# Patient Record
Sex: MALE | Race: WHITE | NOT HISPANIC OR LATINO | Employment: FULL TIME | URBAN - METROPOLITAN AREA
[De-identification: names, ages, dates, MRNs, and addresses within clinical notes are randomized per-mention and may not be internally consistent; named-entity substitution may affect disease eponyms.]

---

## 2018-01-26 ENCOUNTER — TELEPHONE (OUTPATIENT)
Dept: FAMILY MEDICINE CLINIC | Facility: CLINIC | Age: 58
End: 2018-01-26

## 2018-01-26 DIAGNOSIS — J06.9 ACUTE URI: Primary | ICD-10-CM

## 2018-01-26 RX ORDER — AZITHROMYCIN 250 MG/1
TABLET, FILM COATED ORAL
Qty: 6 TABLET | Refills: 0 | Status: SHIPPED | OUTPATIENT
Start: 2018-01-26 | End: 2018-01-31

## 2018-01-31 DIAGNOSIS — Z00.00 WELL ADULT EXAM: Primary | ICD-10-CM

## 2018-01-31 PROCEDURE — 99499EX: Performed by: FAMILY MEDICINE

## 2018-02-05 ENCOUNTER — TRANSCRIBE ORDERS (OUTPATIENT)
Dept: LAB | Facility: CLINIC | Age: 58
End: 2018-02-05

## 2018-02-05 DIAGNOSIS — Z00.00 WELLNESS EXAMINATION: Primary | ICD-10-CM

## 2018-02-06 ENCOUNTER — HOSPITAL ENCOUNTER (OUTPATIENT)
Dept: ULTRASOUND IMAGING | Facility: HOSPITAL | Age: 58
Discharge: HOME | End: 2018-02-06
Attending: FAMILY MEDICINE

## 2018-02-06 ENCOUNTER — HOSPITAL ENCOUNTER (OUTPATIENT)
Dept: CT IMAGING | Facility: HOSPITAL | Age: 58
Discharge: HOME | End: 2018-02-06
Attending: FAMILY MEDICINE
Payer: COMMERCIAL

## 2018-02-06 ENCOUNTER — OFFICE VISIT (OUTPATIENT)
Dept: LAB | Facility: CLINIC | Age: 58
End: 2018-02-06
Payer: COMMERCIAL

## 2018-02-06 ENCOUNTER — OFFICE VISIT (OUTPATIENT)
Dept: FAMILY MEDICINE CLINIC | Facility: CLINIC | Age: 58
End: 2018-02-06
Payer: COMMERCIAL

## 2018-02-06 ENCOUNTER — HOSPITAL ENCOUNTER (OUTPATIENT)
Dept: NON INVASIVE DIAGNOSTICS | Facility: CLINIC | Age: 58
Discharge: HOME | End: 2018-02-06

## 2018-02-06 ENCOUNTER — HOSPITAL ENCOUNTER (OUTPATIENT)
Dept: ULTRASOUND IMAGING | Facility: HOSPITAL | Age: 58
Discharge: HOME | End: 2018-02-06
Attending: FAMILY MEDICINE
Payer: COMMERCIAL

## 2018-02-06 ENCOUNTER — APPOINTMENT (OUTPATIENT)
Dept: LAB | Facility: CLINIC | Age: 58
End: 2018-02-06
Payer: COMMERCIAL

## 2018-02-06 VITALS
DIASTOLIC BLOOD PRESSURE: 86 MMHG | RESPIRATION RATE: 16 BRPM | HEIGHT: 70 IN | WEIGHT: 177.5 LBS | HEART RATE: 63 BPM | SYSTOLIC BLOOD PRESSURE: 134 MMHG | BODY MASS INDEX: 25.41 KG/M2

## 2018-02-06 DIAGNOSIS — Z00.00 WELL ADULT EXAM: ICD-10-CM

## 2018-02-06 DIAGNOSIS — H61.23 BILATERAL IMPACTED CERUMEN: ICD-10-CM

## 2018-02-06 DIAGNOSIS — Z00.00 WELLNESS EXAMINATION: Primary | ICD-10-CM

## 2018-02-06 DIAGNOSIS — N40.0 BPH WITHOUT OBSTRUCTION/LOWER URINARY TRACT SYMPTOMS: ICD-10-CM

## 2018-02-06 DIAGNOSIS — Z12.11 COLON CANCER SCREENING: Primary | ICD-10-CM

## 2018-02-06 DIAGNOSIS — R03.0 BLOOD PRESSURE ELEVATED WITHOUT HISTORY OF HTN: ICD-10-CM

## 2018-02-06 PROBLEM — E78.01 FAMILIAL HYPERCHOLESTEROLEMIA: Status: ACTIVE | Noted: 2018-02-06

## 2018-02-06 LAB
25(OH)D3 SERPL-MCNC: 34.1 NG/ML (ref 30–100)
ALBUMIN SERPL BCP-MCNC: 4.2 G/DL (ref 3.5–5)
ALP SERPL-CCNC: 74 U/L (ref 46–116)
ALT SERPL W P-5'-P-CCNC: 68 U/L (ref 12–78)
ANION GAP SERPL CALCULATED.3IONS-SCNC: 8 MMOL/L (ref 4–13)
AST SERPL W P-5'-P-CCNC: 24 U/L (ref 5–45)
ATRIAL RATE: 70 BPM
BACTERIA UR QL AUTO: ABNORMAL /HPF
BASOPHILS # BLD AUTO: 0.03 THOUSANDS/ΜL (ref 0–0.1)
BASOPHILS NFR BLD AUTO: 1 % (ref 0–1)
BILIRUB SERPL-MCNC: 1.6 MG/DL (ref 0.2–1)
BILIRUB UR QL STRIP: NEGATIVE
BUN SERPL-MCNC: 17 MG/DL (ref 5–25)
CALCIUM SERPL-MCNC: 9.6 MG/DL (ref 8.3–10.1)
CHLORIDE SERPL-SCNC: 103 MMOL/L (ref 100–108)
CHOLEST SERPL-MCNC: 224 MG/DL (ref 50–200)
CLARITY UR: CLEAR
CO2 SERPL-SCNC: 29 MMOL/L (ref 21–32)
COLOR UR: YELLOW
CREAT SERPL-MCNC: 1.01 MG/DL (ref 0.6–1.3)
CRP SERPL QL: <3 MG/L
EOSINOPHIL # BLD AUTO: 0.09 THOUSAND/ΜL (ref 0–0.61)
EOSINOPHIL NFR BLD AUTO: 2 % (ref 0–6)
ERYTHROCYTE [DISTWIDTH] IN BLOOD BY AUTOMATED COUNT: 12.4 % (ref 11.6–15.1)
EST. AVERAGE GLUCOSE BLD GHB EST-MCNC: 91 MG/DL
GFR SERPL CREATININE-BSD FRML MDRD: 83 ML/MIN/1.73SQ M
GLUCOSE P FAST SERPL-MCNC: 108 MG/DL (ref 65–99)
GLUCOSE UR STRIP-MCNC: NEGATIVE MG/DL
HBA1C MFR BLD: 4.8 % (ref 4.2–6.3)
HCT VFR BLD AUTO: 48.3 % (ref 36.5–49.3)
HDLC SERPL-MCNC: 71 MG/DL (ref 40–60)
HGB BLD-MCNC: 16.9 G/DL (ref 12–17)
HGB UR QL STRIP.AUTO: NEGATIVE
KETONES UR STRIP-MCNC: NEGATIVE MG/DL
LDLC SERPL CALC-MCNC: 124 MG/DL (ref 0–100)
LEUKOCYTE ESTERASE UR QL STRIP: NEGATIVE
LYMPHOCYTES # BLD AUTO: 1.42 THOUSANDS/ΜL (ref 0.6–4.47)
LYMPHOCYTES NFR BLD AUTO: 30 % (ref 14–44)
MCH RBC QN AUTO: 30.5 PG (ref 26.8–34.3)
MCHC RBC AUTO-ENTMCNC: 35 G/DL (ref 31.4–37.4)
MCV RBC AUTO: 87 FL (ref 82–98)
MONOCYTES # BLD AUTO: 0.4 THOUSAND/ΜL (ref 0.17–1.22)
MONOCYTES NFR BLD AUTO: 9 % (ref 4–12)
NEUTROPHILS # BLD AUTO: 2.77 THOUSANDS/ΜL (ref 1.85–7.62)
NEUTS SEG NFR BLD AUTO: 58 % (ref 43–75)
NITRITE UR QL STRIP: NEGATIVE
NON-SQ EPI CELLS URNS QL MICRO: ABNORMAL /HPF
P AXIS: 62 DEGREES
PH UR STRIP.AUTO: 6 [PH] (ref 4.5–8)
PLATELET # BLD AUTO: 225 THOUSANDS/UL (ref 149–390)
PMV BLD AUTO: 10.8 FL (ref 8.9–12.7)
POTASSIUM SERPL-SCNC: 4.4 MMOL/L (ref 3.5–5.3)
PR INTERVAL: 164 MS
PROT SERPL-MCNC: 7.8 G/DL (ref 6.4–8.2)
PROT UR STRIP-MCNC: NEGATIVE MG/DL
PSA SERPL-MCNC: 1 NG/ML (ref 0–4)
QRS AXIS: 67 DEGREES
QRSD INTERVAL: 86 MS
QT INTERVAL: 396 MS
QTC INTERVAL: 427 MS
RBC # BLD AUTO: 5.54 MILLION/UL (ref 3.88–5.62)
RBC #/AREA URNS AUTO: ABNORMAL /HPF
SODIUM SERPL-SCNC: 140 MMOL/L (ref 136–145)
SP GR UR STRIP.AUTO: <=1.005 (ref 1–1.03)
T WAVE AXIS: 54 DEGREES
TRIGL SERPL-MCNC: 144 MG/DL
TSH SERPL DL<=0.05 MIU/L-ACNC: 1.43 UIU/ML (ref 0.36–3.74)
UROBILINOGEN UR QL STRIP.AUTO: 0.2 E.U./DL
VENTRICULAR RATE: 70 BPM
WBC # BLD AUTO: 4.71 THOUSAND/UL (ref 4.31–10.16)
WBC #/AREA URNS AUTO: ABNORMAL /HPF

## 2018-02-06 PROCEDURE — 93306 TTE W/DOPPLER COMPLETE: CPT

## 2018-02-06 PROCEDURE — 75571 CT HRT W/O DYE W/CA TEST: CPT

## 2018-02-06 PROCEDURE — 83036 HEMOGLOBIN GLYCOSYLATED A1C: CPT

## 2018-02-06 PROCEDURE — 93351 STRESS TTE COMPLETE: CPT | Performed by: INTERNAL MEDICINE

## 2018-02-06 PROCEDURE — 93979 VASCULAR STUDY: CPT | Performed by: SURGERY

## 2018-02-06 PROCEDURE — 84153 ASSAY OF PSA TOTAL: CPT

## 2018-02-06 PROCEDURE — 84443 ASSAY THYROID STIM HORMONE: CPT

## 2018-02-06 PROCEDURE — 93005 ELECTROCARDIOGRAM TRACING: CPT

## 2018-02-06 PROCEDURE — 85025 COMPLETE CBC W/AUTO DIFF WBC: CPT

## 2018-02-06 PROCEDURE — 80053 COMPREHEN METABOLIC PANEL: CPT

## 2018-02-06 PROCEDURE — 93350 STRESS TTE ONLY: CPT

## 2018-02-06 PROCEDURE — 93922 UPR/L XTREMITY ART 2 LEVELS: CPT | Performed by: SURGERY

## 2018-02-06 PROCEDURE — 81001 URINALYSIS AUTO W/SCOPE: CPT | Performed by: FAMILY MEDICINE

## 2018-02-06 PROCEDURE — 82306 VITAMIN D 25 HYDROXY: CPT

## 2018-02-06 PROCEDURE — 80061 LIPID PANEL: CPT

## 2018-02-06 PROCEDURE — 99499EX: Performed by: FAMILY MEDICINE

## 2018-02-06 PROCEDURE — 36415 COLL VENOUS BLD VENIPUNCTURE: CPT

## 2018-02-06 PROCEDURE — 76700 US EXAM ABDOM COMPLETE: CPT

## 2018-02-06 PROCEDURE — 93306 TTE W/DOPPLER COMPLETE: CPT | Performed by: INTERNAL MEDICINE

## 2018-02-06 PROCEDURE — 86140 C-REACTIVE PROTEIN: CPT

## 2018-02-06 PROCEDURE — 93880 EXTRACRANIAL BILAT STUDY: CPT | Performed by: SURGERY

## 2018-02-06 PROCEDURE — 3008F BODY MASS INDEX DOCD: CPT | Performed by: FAMILY MEDICINE

## 2018-02-06 RX ORDER — CALCIUM CARBONATE/VITAMIN D3 600 MG-10
1 TABLET ORAL 2 TIMES DAILY
Qty: 60 CAPSULE | Refills: 11 | Status: SHIPPED | COMMUNITY
Start: 2018-02-05 | End: 2022-06-28

## 2018-02-06 RX ORDER — CALCIUM GLUCONATE 45(500) MG
500 TABLET ORAL 2 TIMES DAILY
Qty: 60 TABLET | Refills: 11 | Status: SHIPPED | COMMUNITY
Start: 2018-02-05 | End: 2022-06-28

## 2018-02-06 RX ORDER — CALCIUM CARBONATE 260MG(650)
1 TABLET,CHEWABLE ORAL 2 TIMES DAILY
Qty: 60 TABLET | Refills: 11 | Status: SHIPPED | COMMUNITY
Start: 2018-02-05 | End: 2020-09-17

## 2018-02-06 RX ORDER — MELATONIN
1000 DAILY
Qty: 30 TABLET | Refills: 11 | Status: SHIPPED | COMMUNITY
Start: 2018-02-05 | End: 2022-06-28

## 2018-02-06 NOTE — PROGRESS NOTES
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, P / Human Resources , who presents today to [facility] for a full Wellness / Fitness Exam   He learned about our program through our system website  His PCPs are Khurram Ledesma and Kolby perera in [place], [place]  He reports that his overall health status is "excellent,"  And that it has been "stable" over the last few years  He feels that his overall fitness level is "above average "  He notes that his overall stress level through his career is somewhat high  Review of Systems   Constitutional: Negative for activity change and appetite change  HENT: Negative for hearing loss, rhinorrhea and voice change  Eyes: Negative for visual disturbance  Wears glasses for reading only  Respiratory: Negative for shortness of breath  Cardiovascular: Negative for chest pain, palpitations and leg swelling  Gastrointestinal: Negative for abdominal pain, blood in stool and nausea  Genitourinary: Negative for decreased urine volume, difficulty urinating and frequency  No ED symptoms  One void per night  Musculoskeletal: Negative for arthralgias and neck stiffness  Pt has a chronic right shoulder stiffness  Skin: Negative for rash  Neurological: Negative for syncope and headaches  Hematological: Negative for adenopathy  Psychiatric/Behavioral: Negative for dysphoric mood  The patient is not nervous/anxious  The following portions of the patient's history were reviewed and updated as appropriate: allergies, current medications, past family history, past medical history, past social history and past surgical history       Past Medical History:   Diagnosis Date    Hyperlipidemia      Past Surgical History: Procedure Laterality Date    COLONOSCOPY  2001    COLONOSCOPY  2010    Virtual colonoscopy - clear   KNEE ARTHROSCOPY Left 2006    Meniscus   KNEE ARTHROSCOPY Right 2016    Meniscus   TONSILLECTOMY  1969       Current Outpatient Prescriptions:     calcium gluconate 500 mg tablet, Take 1 tablet (500 mg total) by mouth 2 (two) times a day for 30 days, Disp: 60 tablet, Rfl: 11    cholecalciferol (VITAMIN D3) 1,000 units tablet, Take 1 tablet (1,000 Units total) by mouth daily for 30 days, Disp: 30 tablet, Rfl: 11    Chromium 400 MCG TABS, Take 1 tablet (400 mcg total) by mouth 2 (two) times a day for 30 days, Disp: 60 tablet, Rfl: 11    Omega 3 1200 MG CAPS, Take 1 capsule (1,200 mg total) by mouth 2 (two) times a day for 30 days, Disp: 60 capsule, Rfl: 11    saw palmetto 160 MG capsule, Take 1 capsule (160 mg total) by mouth 2 (two) times a day for 30 days, Disp: 30 capsule, Rfl: 11    Allergies   Allergen Reactions    Penicillins Rash     Family History   Problem Relation Age of Onset    Heart disease Father     Hyperlipidemia Father     Hyperlipidemia Brother      Social History     Social History    Marital status:      Spouse name: N/A    Number of children: 2    Years of education: N/A     Occupational History    SVP, Human Resources       [employer] [address]     Social History Main Topics    Smoking status: Never Smoker    Smokeless tobacco: Never Used    Alcohol use 4 2 - 6 0 oz/week     7 - 10 Standard drinks or equivalent per week    Drug use: No    Sexual activity: Not on file     Other Topics Concern    Not on file     Social History Narrative    Patient exercises 4 - 5 times per week (treadmill, weights, weight machine, calisthenics)  No results found for this or any previous visit  Hearing Screening    Method:  Audiometry    125Hz 250Hz 500Hz 1000Hz 2000Hz 3000Hz 4000Hz 6000Hz 8000Hz   Right ear:  25 25 10 30 15 15 15 30   Left ear:  15 15 15 25 30 35 35 40   Comments: Vene 89 EVALUATION      Name:  Felicia Alas  :  1960  Age:  65 y o  Date of Evaluation: 18     History: [facility] Exam  Reason for visit: Felicia Alas is being seen today at the request of Dr Lillian Jauregui available for an evaluation of hearing  Patient reports that last hearing evaluation (elsewhere approximately 3 years ago) did not reveal  hearing loss in either ear  Hearing aid history:       Right Ear:  none       Left Ear:  none    EVALUATION:    Otoscopic Evaluation:   Right Ear: Moderate cerumen   Left Ear: Moderate cerumen    Tympanometry:   Right: Type A Volume: 1 0  Pressure: -20  Compliance: 1 2    Left: Type A Volume: 1 1  Pressure: 0  Compliance: 1 4     Audiogram Results:    Right Ear: Essentially normal hearing sensitivity except for mild sensorineural hearing loss at 1500 and 2k Hz  Left Ear: Normal hearing sensitivity through 1k Hz sloping to mild high frequency sensorineural hearing loss  Note: 20 dB asymmetry at 4k and 6k Hz        RECOMMENDATIONS:  Return to [facility]  for F/U - asymmetrical high frequency hearing loss  Ear cleaning  Hearing evaluation in 1 year (sooner if deemed medically necessary)  PATIENT EDUCATION:   Discussed results and recommendations with patient  Questions were addressed and the patient was encouraged to contact our department should concerns arise  Mi Perez   Clinical Audiologist]     Visual Acuity Screening    Right eye Left eye Both eyes   Without correction: 20/20 20/20 20/20   With correction:        FITNESS SCREENING:   Carlin Olsen scored at the 50% on his body composition assessment with a bodyfat % of 23%  Carlin Olsen scored in the above average range for flexibility with a sit & reach score of 10 cm  His Muscle Strength/Endurance scores placed his at the 95% (lower body) and 95% (upper body) with a chair stand score of 30 and arm curl score of 37 respectfully   [facility] Score of 51 5 placed him at the 95%  Overall Sarabjit Love would be considered to have an above average fitness level with a 69% score  Continued emphasis on regular exercise and sound nutrition practices will enable Sarabjit Love to maintain his optimal level of fitness      Recent Results (from the past 24 hour(s))   Urinalysis with microscopic    Collection Time: 02/06/18  7:19 AM   Result Value Ref Range    Clarity, UA Clear     Color, UA Yellow     Specific Gravity, UA <=1 005 1 003 - 1 030    pH, UA 6 0 4 5 - 8 0    Glucose, UA Negative Negative mg/dl    Ketones, UA Negative Negative mg/dl    Blood, UA Negative Negative    Protein, UA Negative Negative mg/dl    Nitrite, UA Negative Negative    Bilirubin, UA Negative Negative    Urobilinogen, UA 0 2 0 2, 1 0 E U /dl E U /dl    Leukocytes, UA Negative Negative    WBC, UA 0-1 (A) None Seen, 0-5, 5-55, 5-65 /hpf    RBC, UA None Seen None Seen, 0-5 /hpf    Bacteria, UA None Seen None Seen, Occasional /hpf    Epithelial Cells Occasional None Seen, Occasional /hpf   CBC and differential    Collection Time: 02/06/18  7:19 AM   Result Value Ref Range    WBC 4 71 4 31 - 10 16 Thousand/uL    RBC 5 54 3 88 - 5 62 Million/uL    Hemoglobin 16 9 12 0 - 17 0 g/dL    Hematocrit 48 3 36 5 - 49 3 %    MCV 87 82 - 98 fL    MCH 30 5 26 8 - 34 3 pg    MCHC 35 0 31 4 - 37 4 g/dL    RDW 12 4 11 6 - 15 1 %    MPV 10 8 8 9 - 12 7 fL    Platelets 972 633 - 535 Thousands/uL    Neutrophils Relative 58 43 - 75 %    Lymphocytes Relative 30 14 - 44 %    Monocytes Relative 9 4 - 12 %    Eosinophils Relative 2 0 - 6 %    Basophils Relative 1 0 - 1 %    Neutrophils Absolute 2 77 1 85 - 7 62 Thousands/µL    Lymphocytes Absolute 1 42 0 60 - 4 47 Thousands/µL    Monocytes Absolute 0 40 0 17 - 1 22 Thousand/µL    Eosinophils Absolute 0 09 0 00 - 0 61 Thousand/µL    Basophils Absolute 0 03 0 00 - 0 10 Thousands/µL   Comprehensive metabolic panel    Collection Time: 02/06/18  7:19 AM   Result Value Ref Range Sodium 140 136 - 145 mmol/L    Potassium 4 4 3 5 - 5 3 mmol/L    Chloride 103 100 - 108 mmol/L    CO2 29 21 - 32 mmol/L    Anion Gap 8 4 - 13 mmol/L    BUN 17 5 - 25 mg/dL    Creatinine 1 01 0 60 - 1 30 mg/dL    Glucose, Fasting 108 (H) 65 - 99 mg/dL    Calcium 9 6 8 3 - 10 1 mg/dL    AST 24 5 - 45 U/L    ALT 68 12 - 78 U/L    Alkaline Phosphatase 74 46 - 116 U/L    Total Protein 7 8 6 4 - 8 2 g/dL    Albumin 4 2 3 5 - 5 0 g/dL    Total Bilirubin 1 60 (H) 0 20 - 1 00 mg/dL    eGFR 83 ml/min/1 73sq m   C-reactive protein    Collection Time: 02/06/18  7:19 AM   Result Value Ref Range    CRP <3 0 <3 0 mg/L   Hemoglobin A1c    Collection Time: 02/06/18  7:19 AM   Result Value Ref Range    Hemoglobin A1C 4 8 4 2 - 6 3 %    EAG 91 mg/dl   Lipid Panel with Direct LDL reflex    Collection Time: 02/06/18  7:19 AM   Result Value Ref Range    Cholesterol 224 (H) 50 - 200 mg/dL    Triglycerides 144 <=150 mg/dL    HDL, Direct 71 (H) 40 - 60 mg/dL    LDL Calculated 124 (H) 0 - 100 mg/dL   TSH, 3rd generation with T4 reflex    Collection Time: 02/06/18  7:19 AM   Result Value Ref Range    TSH 3RD GENERATON 1 426 0 358 - 3 740 uIU/mL   Vitamin D 25 hydroxy    Collection Time: 02/06/18  7:19 AM   Result Value Ref Range    Vit D, 25-Hydroxy 34 1 30 0 - 100 0 ng/mL   PSA Total, Diagnostic    Collection Time: 02/06/18  7:19 AM   Result Value Ref Range    PSA 1 0 0 0 - 4 0 ng/mL   ECG 12 lead    Collection Time: 02/06/18 10:35 AM   Result Value Ref Range    Ventricular Rate 70 BPM    Atrial Rate 70 BPM    FL Interval 164 ms    QRSD Interval 86 ms    QT Interval 396 ms    QTC Interval 427 ms    P Axis 62 degrees    QRS Axis 67 degrees    T Wave Axis 54 degrees       Objective:  Vitals:    02/06/18 1124   BP: 134/86   Pulse: 63   Resp: 16        Physical Exam   Constitutional: He is oriented to person, place, and time  He appears well-developed and well-nourished  No distress  HENT:   Head: Normocephalic and atraumatic     Right Ear: Hearing, tympanic membrane, external ear and ear canal normal    Left Ear: Hearing, tympanic membrane, external ear and ear canal normal    Mouth/Throat: Uvula is midline, oropharynx is clear and moist and mucous membranes are normal  Normal dentition  No oropharyngeal exudate or posterior oropharyngeal erythema  Eyes: Conjunctivae and EOM are normal  Pupils are equal, round, and reactive to light  No scleral icterus  Neck: Normal range of motion  Neck supple  No thyromegaly present  Cardiovascular: Normal rate, regular rhythm, normal heart sounds and intact distal pulses  Exam reveals no gallop and no friction rub  No murmur heard  Pulmonary/Chest: Effort normal and breath sounds normal  No respiratory distress  He has no wheezes  He has no rales  Abdominal: Soft  Bowel sounds are normal  He exhibits no distension and no mass  There is no tenderness  There is no rebound and no guarding  Hernia confirmed negative in the right inguinal area and confirmed negative in the left inguinal area  Genitourinary: Testes normal and penis normal  Rectal exam shows external hemorrhoid  Rectal exam shows no mass and anal tone normal  Prostate is enlarged  Prostate is not tender  Right testis shows no mass  Left testis shows no mass  Circumcised  Genitourinary Comments: +1 Sized prostate, but smooth on exam; no nodules  1 Small, non-irritated, non-thrombosed external hemorrhoid  Musculoskeletal: Normal range of motion  He exhibits no edema  Mild TTP in the region of the right subacromial bursa; normal ROM and strength of right shoulder  Lymphadenopathy:     He has no cervical adenopathy  Neurological: He is alert and oriented to person, place, and time  He has normal strength and normal reflexes  No cranial nerve deficit  Gait normal    Skin: Skin is warm  No rash noted  He is not diaphoretic  Psychiatric: He has a normal mood and affect   His behavior is normal  Judgment and thought content normal    Vitals reviewed  Assessment/Plan:    Diagnoses and all orders for this visit:    Wellness examination    Blood pressure elevated without history of HTN    BPH without obstruction/lower urinary tract symptoms    Bilateral impacted cerumen           Executive Physical Summary:  See below  [facility], it was great getting work with you today, and we here at [facility] thank you for your time, and consideration of our program   Recommendations that I have for you today -    1)  Wellness:  Keep up the good work with your regular exercise  Continue annual Influenza Vaccines, and to see your PCPs annually  Do not forget about colon cancer screening with Gastroenterology, and we can help get you information for the group in [facility]  2)  BP Elevated:  Borderline blood pressures today on exam   Continue to work on a healthy, lower salt diet, and regular exercise  I suggest getting a cuff at your pharmacy, and checking intermittently at home  3)  BPH, Without Symptoms:  Prostate is slightly enlarged on exam today Dilan, but with a stable PSA, and lack of symptoms, there is not much to do  You can continue the TXU Suzanna  Watch for any straining to void, slower urine flow, getting up at night to void, etc     4)  Bilateral Cerumen Impaction:  Can try OTC Debrox treatments, and consult with your PCP or ENT for possible irrigation  Again [facility] - it was great getting to see you today  If you need to contact me, don't hesitate to reach out! Be well,    Carmen Amin DO  Office  [phone]  Cell  [phone]  [email or web address]

## 2018-02-06 NOTE — PROGRESS NOTES
Nutritional Summary and Recommendations: Fabby Anglin presents for Nutrition Assessment and Education today  he has met with an RD in the past  He admits that his snacking throughout the day is mostly vending foods and of poor nutrition quality  Discussion focused on balancing meal and snacks times, inclusion of adequate protein consumption,body composition goals and improved serum Vitamin D levels  HT:70",WT: 174 8#, BMI 25 1, Tanita BMR 1671 kcal, Fat Mass 40 2lb,  6lb, Desired Fat Mass 28 0 lb  Meds: reviewed  Labs: Vit D 34 1, Chol 224, Trig 144, HDL 71, , HgbA1C 4 8, Glu 108  Nutrition Diagnoses: Food nutrition knowledge deficit related to sub optimal nutrient balance as evidenced by food recall and patient interview  Goals:  1  Consume 9850-1410 kcal, 90 gram protein daily  2  Food Journal with jamespal   3 Acheive 38 lb fat mass within 3 months and long term goal 27 lb fat mass in 12-18 months  4  Maintain Vit D levels >30 with Vit D prescription as discussed and directed by doctor  5  Include lean proteins at all meals and 1-2 snack daily    Perceived Comprehension: Very Good                          Expected Compliance:Very Good

## 2018-02-06 NOTE — PROGRESS NOTES
Heart and Vascular Summary:     1  Lipids - Total 224, , HDL 71,     2  ECG - Normal ECG    3  Echocardiogram - Normal    4  Stress ECHO - Hypertensive response to stress  No symptoms, Abnormal ECG likely due to hypertensive response  Normal Imaging  No evidence of ischemia  Otherwise normal stress echo  5  Coronary Calcium CT score - 0  Low risk score  6  ASCVD risk score - 5 57%    7  C-reactive protein - <3  (normal)    Impression and Recommendations:    Overall, cardiac risk is low  No indication to treat lipids with statin therapy  Pay close attention to blood pressure  Aim for goal resting blood pressure <130/80  Decrease dietary sodium, less than 2000mg,day and focus on less than 10% sodium per serving  There is no guideline or known beneficial treatment to treating a hypertensive response to stress testing

## 2018-02-07 LAB
CHEST PAIN STATEMENT: NORMAL
MAX DIASTOLIC BP: 70 MMHG
MAX HEART RATE: 153 BPM
MAX PREDICTED HEART RATE: 164 BPM
MAX. SYSTOLIC BP: 218 MMHG
PROTOCOL NAME: NORMAL
REASON FOR TERMINATION: NORMAL
TARGET HR FORMULA: NORMAL
TEST INDICATION: NORMAL
TIME IN EXERCISE PHASE: NORMAL

## 2018-03-12 ENCOUNTER — TELEPHONE (OUTPATIENT)
Dept: GASTROENTEROLOGY | Facility: CLINIC | Age: 58
End: 2018-03-12

## 2018-03-12 PROBLEM — Z12.11 SPECIAL SCREENING FOR MALIGNANT NEOPLASMS, COLON: Status: ACTIVE | Noted: 2018-03-12

## 2018-03-12 NOTE — TELEPHONE ENCOUNTER
03/12/18  Screened by: Erik Thakkar    Referring Provider     Pre- Screening: There is no height or weight on file to calculate BMI  Has patient been referred for a routine screening Colonoscopy? yes  Is the patient over 48years of age? yes    Do you have a family history of colon cancer? yes    Do you have a personal history of colon polyps? yes    Do you have any of the following symptoms?  no    Have you had a coronary or vascular stent within the last year? no    Have you had a heart attack or stroke in the last 6 months? no    Have you had intestinal surgery in the last 3 months? no    Do you have problems with: no    Do you use: no    Have you been hospitalized in the last Month? no    Have you been diagnosed with a bleeding disorder or anemia? no    Have you had chest pain (angina) or breathing problems  (COPD) in the last 3 months? no    Do you have any difficulty walking up a flight of stairs? no    Have you had Kidney failure or insufficiency? no    Have you had heart valve surgery? no    Are you confined to a wheelchair? no    Do you take Other blood thinning medications no    Have you been diagnosed with Diabetes or are you taking any   Diabetic medications? no    : If patient answers NO to medical questions, then schedule procedure  If patient answers YES to medical questions, then schedule office appointment  Previous Colonoscopy yes  Date and Facility of last colonoscopy?      Comments: 3/21/18

## 2018-03-15 ENCOUNTER — TRANSCRIBE ORDERS (OUTPATIENT)
Dept: GASTROENTEROLOGY | Facility: CLINIC | Age: 58
End: 2018-03-15

## 2018-03-27 ENCOUNTER — HOSPITAL ENCOUNTER (OUTPATIENT)
Facility: HOSPITAL | Age: 58
Setting detail: OUTPATIENT SURGERY
Discharge: HOME | End: 2018-03-27
Attending: INTERNAL MEDICINE | Admitting: INTERNAL MEDICINE
Payer: COMMERCIAL

## 2018-03-27 ENCOUNTER — ANESTHESIA (OUTPATIENT)
Dept: PERIOP | Facility: HOSPITAL | Age: 58
End: 2018-03-27
Payer: COMMERCIAL

## 2018-03-27 ENCOUNTER — ANESTHESIA EVENT (OUTPATIENT)
Dept: PERIOP | Facility: HOSPITAL | Age: 58
End: 2018-03-27
Payer: COMMERCIAL

## 2018-03-27 VITALS
TEMPERATURE: 97.4 F | OXYGEN SATURATION: 97 % | BODY MASS INDEX: 25.62 KG/M2 | HEIGHT: 69 IN | DIASTOLIC BLOOD PRESSURE: 82 MMHG | HEART RATE: 61 BPM | RESPIRATION RATE: 14 BRPM | WEIGHT: 173 LBS | SYSTOLIC BLOOD PRESSURE: 121 MMHG

## 2018-03-27 PROCEDURE — G0121 COLON CA SCRN NOT HI RSK IND: HCPCS | Performed by: INTERNAL MEDICINE

## 2018-03-27 RX ORDER — LIDOCAINE HYDROCHLORIDE 10 MG/ML
INJECTION, SOLUTION EPIDURAL; INFILTRATION; INTRACAUDAL; PERINEURAL AS NEEDED
Status: DISCONTINUED | OUTPATIENT
Start: 2018-03-27 | End: 2018-03-27 | Stop reason: SURG

## 2018-03-27 RX ORDER — SODIUM CHLORIDE, SODIUM LACTATE, POTASSIUM CHLORIDE, CALCIUM CHLORIDE 600; 310; 30; 20 MG/100ML; MG/100ML; MG/100ML; MG/100ML
50 INJECTION, SOLUTION INTRAVENOUS CONTINUOUS
Status: DISCONTINUED | OUTPATIENT
Start: 2018-03-27 | End: 2018-03-27 | Stop reason: HOSPADM

## 2018-03-27 RX ORDER — PROPOFOL 10 MG/ML
INJECTION, EMULSION INTRAVENOUS AS NEEDED
Status: DISCONTINUED | OUTPATIENT
Start: 2018-03-27 | End: 2018-03-27 | Stop reason: SURG

## 2018-03-27 RX ADMIN — PROPOFOL 100 MG: 10 INJECTION, EMULSION INTRAVENOUS at 10:21

## 2018-03-27 RX ADMIN — PROPOFOL 40 MG: 10 INJECTION, EMULSION INTRAVENOUS at 10:24

## 2018-03-27 RX ADMIN — SODIUM CHLORIDE, SODIUM LACTATE, POTASSIUM CHLORIDE, AND CALCIUM CHLORIDE 50 ML/HR: .6; .31; .03; .02 INJECTION, SOLUTION INTRAVENOUS at 09:25

## 2018-03-27 RX ADMIN — LIDOCAINE HYDROCHLORIDE 25 MG: 10 INJECTION, SOLUTION EPIDURAL; INFILTRATION; INTRACAUDAL; PERINEURAL at 10:21

## 2018-03-27 RX ADMIN — SODIUM CHLORIDE, SODIUM LACTATE, POTASSIUM CHLORIDE, AND CALCIUM CHLORIDE: .6; .31; .03; .02 INJECTION, SOLUTION INTRAVENOUS at 10:04

## 2018-03-27 NOTE — ANESTHESIA PREPROCEDURE EVALUATION
Review of Systems/Medical History  Patient summary reviewed  Chart reviewed      Cardiovascular  Hyperlipidemia,    Pulmonary       GI/Hepatic            Endo/Other     GYN       Hematology   Musculoskeletal       Neurology   Psychology           Physical Exam    Airway    Mallampati score: II  TM Distance: >3 FB  Neck ROM: full     Dental   No notable dental hx     Cardiovascular  Rhythm: regular, Rate: normal, Cardiovascular exam normal    Pulmonary  Pulmonary exam normal Breath sounds clear to auscultation,     Other Findings        Anesthesia Plan  ASA Score- 2     Anesthesia Type- IV sedation with anesthesia with ASA Monitors  Additional Monitors:   Airway Plan:         Plan Factors-    Induction- intravenous  Postoperative Plan- Plan for postoperative opioid use  Informed Consent- Anesthetic plan and risks discussed with patient  I personally reviewed this patient with the CRNA  Discussed and agreed on the Anesthesia Plan with the CRNA  Arsenio Bradford

## 2018-03-27 NOTE — OP NOTE
Postoperative Note  PATIENT NAME: Warden Shepard  : 1960  MRN: [id number]  [facility] ROOM 01    Surgery Date: 3/27/2018    POST-OP DIAGNOSIS: See the impression below    SEDATION: Monitored anesthesia care, check anesthesia records    PHYSICAL EXAM:  Vitals:    18 1030   BP: 113/70   Pulse: 68   Resp: 16   Temp:    SpO2: 99%     Body mass index is 25 55 kg/m²  General: NAD  Heart: S1 & S2 normal, RRR  Lungs: CTA, No rales or rhonchi  Abdomen: Soft, nontender, nondistended, good bowel sounds    CONSENT:  Informed consent was obtained for the procedure, including sedation after explaining the risks and benefits of the procedure  Risks including but not limited to bleeding, perforation, infection, aspiration were discussed in detail  Also explained about less than 100%$ sensitivity with the exam and other alternatives  DESCRIPTION:   Procedure:  Fiberoptic colonoscopy  Indications:  Routine age-appropriate screening  Last colonoscopy 10 years ago  ASA 2  Premedicated with mac anesthesia    Patient is identified by me  He is examined prior to the procedure found to be in stable condition  He is attached to the cardiac monitor and pulse oximeter and placed in the left lateral position  After adequate intravenous sedation the Olympus video colonoscope was inserted and passed easily to the terminal ileum  The ileocecal valve and appendiceal orifice are identified  The scope was then slowly withdrawn with excellent circumferential visualization of the mucosa  The preparation is excellent  The terminal ileum up to 10 cm in the entire and the colonic mucosa out completely unremarkable with no inflammatory neoplastic vascular lesions noted  Retroversion is normal  Incidental note is made of a small thrombosed external hemorrhoid which is not bleeding  The patient tolerated the procedure very well and was stable throughout      My impression:  Normal colonoscopy, normal terminal ileoscopy RECOMMENDATIONS:  Follow-up colonoscopy in 10 years  Yearly  FIT test with primary care physician  Every 3 year Cologuard test with primary care physician    COMPLICATIONS:  None; patient tolerated the procedure well  DISPOSITION: PACU  CONDITION: Stable    Benjamin Hawthorne MD  3/27/2018,10:31 AM        Portions of the record may have been created with voice recognition software   Occasional wrong word or "sound a like" substitutions may have occurred due to the inherent limitations of voice recognition software   Read the chart carefully and recognize, using context, where substitutions have occurred

## 2018-03-27 NOTE — DISCHARGE INSTRUCTIONS
Colonoscopy   AMBULATORY CARE:   What you need to know about a colonoscopy:  A colonoscopy is a procedure to examine the inside of your colon (intestine) with a scope  A scope is a flexible tube with a small light and camera on the end  Polyps or tissue growths may be removed during your colonoscopy  What you need to do the week before your colonoscopy: You will need to stop taking medicines that contain aspirin or iron for 7 days before your colonoscopy  If you take anticoagulants, such as warfarin, ask when you should stop taking it  Make plans for someone to drive you home after your procedure  How to prepare for your colonoscopy: Your healthcare provider will have you prepare your bowels before your procedure  Your bowels will need to be empty before your procedure to allow him to clearly see your colon  You will need to do the following the day before your procedure:  · Have only clear liquids  for the entire day before your colonoscopy  Clear liquid diet includes clear fruit juices and broths, clear flavored gelatin, and hard candy  It also includes coffee, tea, carbonated beverages, and clear sports drinks  · Follow your bowel prep as directed  There are many different preparations that can be given before a colonoscopy  Some are given over 2 hours and others over 6 hours  Some are given earlier in the afternoon the day before the colonoscopy  Others are given the day before and then the morning of the colonoscopy  With any bowel prep, stay close to the bathroom  This liquid will cause your bowels to move frequently  · An enema  may be needed  Your healthcare provider may tell you to use an enema to help clean out your bowels  · Do not eat or drink anything after midnight  This will help prevent problems that can happen if you vomit while under anesthesia  What will happen during your colonoscopy:   · You will be given medicine to help you relax   You will lie on your left side and raise one or both knees toward your chest  Your healthcare provider will examine your anus and use a finger to check your rectum  You may need another enema if your bowel is not empty  The scope will be lubricated and gently placed into your anus  It will then be passed through your rectum and into your colon  Water or air will be put into your colon to help clean or expand it  This is done so your healthcare provider can see your colon clearly  · Tissue samples may be taken from the walls of your bowel and sent to a lab for tests  If you have a polyp, your healthcare provider will pass a wire loop through the scope and use it to hold the polyp  The polyp is then burned or cut off the wall of your colon  Removed polyps are sent to a lab for tests  Pictures of your colon may be taken during the procedure  The scope will be removed when the procedure is done  What will happen after your colonoscopy:   · Rest after your procedure  You may feel bloated, have some gas and abdominal discomfort  You may need to lie on your right side with a heating pad on your abdomen  You may need to take short walks to help move the gas out  Eat small meals, if you feel bloated  Do not drive or make important decisions until the day after your procedure  · You may have polyps removed  Do not take aspirin or go on long car trips for 7 days after your procedure  Ask your healthcare provider about any other limits after your procedure  Risks of a colonoscopy: You may have pain or bleeding after the scope or polyps are removed  You may also have a slow heartbeat, decreased blood pressure, or increased sweating  Your colon may tear due to the increased pressure from the scope and other instruments  This may cause bowel contents to leak out of your colon and into your abdomen  If this happens, you will need to stay in the hospital and have surgery on your colon     Seek care immediately if:   · You have a large amount of bright red blood in your bowel movements  · Your abdomen is hard and firm and you have severe pain  · You have sudden trouble breathing  Contact your healthcare provider if:   · You develop a rash or hives  · You have a fever within 24 hours of your procedure  · You have not had a bowel movement for 3 days after your procedure  · You have questions or concerns about your condition or care  Activity:   · Do not lift, strain, or run  for 3 days after your procedure  · Rest after your procedure  You have been given medicine to relax you  Do not  drive or make important decisions until the day after your procedure  Return to your normal activity as directed  · Relieve gas and discomfort from bloating  by lying on your right side with a heating pad on your abdomen  You may need to take short walks to help the gas move out  Eat small meals until bloating is relieved  If you had polyps removed: For 7 days after your procedure:  · Do not  take aspirin  · Do not  go on long car rides  Help prevent constipation:   · Eat a variety of healthy foods  Healthy foods include fruit, vegetables, whole-grain breads, low-fat dairy products, beans, lean meat, and fish  Ask if you need to be on a special diet  Your healthcare provider may recommend that you eat high-fiber foods such as cooked beans  Fiber helps you have regular bowel movements  · Drink liquids as directed  Adults should drink between 9 and 13 eight-ounce cups of liquid every day  Ask what amount is best for you  For most people, good liquids to drink are water, juice, and milk  · Exercise as directed  Talk to your healthcare provider about the best exercise plan for you  Exercise can help prevent constipation, decrease your blood pressure and improve your health  Follow up with your healthcare provider as directed:  Write down your questions so you remember to ask them during your visits     © 2017 [employer] Information is for End User's use only and may not be sold, redistributed or otherwise used for commercial purposes  All illustrations and images included in CareNotes® are the copyrighted property of A D [employer] M [employer]  or Basil Suazo  The above information is an  only  It is not intended as medical advice for individual conditions or treatments  Talk to your doctor, nurse or pharmacist before following any medical regimen to see if it is safe and effective for you

## 2018-03-27 NOTE — ANESTHESIA POSTPROCEDURE EVALUATION
Post-Op Assessment Note      CV Status:  Stable    Mental Status:  Awake    Hydration Status:  Stable    PONV Controlled:  None    Airway Patency:  Patent    Post Op Vitals Reviewed: Yes          Staff: Anesthesiologist, CRNA           /70 (03/27/18 1030)    Temp      Pulse 68 (03/27/18 1030)   Resp 16 (03/27/18 1030)    SpO2 99 % (03/27/18 1030)

## 2019-04-11 DIAGNOSIS — Z00.00 WELLNESS EXAMINATION: Primary | ICD-10-CM

## 2019-04-11 PROCEDURE — 99499EX: Performed by: FAMILY MEDICINE

## 2019-04-16 ENCOUNTER — HOSPITAL ENCOUNTER (OUTPATIENT)
Dept: NON INVASIVE DIAGNOSTICS | Facility: CLINIC | Age: 59
Discharge: HOME | End: 2019-04-16

## 2019-04-16 ENCOUNTER — OFFICE VISIT (OUTPATIENT)
Dept: FAMILY MEDICINE CLINIC | Facility: CLINIC | Age: 59
End: 2019-04-16

## 2019-04-16 ENCOUNTER — APPOINTMENT (OUTPATIENT)
Dept: LAB | Facility: CLINIC | Age: 59
End: 2019-04-16

## 2019-04-16 VITALS
SYSTOLIC BLOOD PRESSURE: 142 MMHG | WEIGHT: 169 LBS | DIASTOLIC BLOOD PRESSURE: 80 MMHG | BODY MASS INDEX: 25.03 KG/M2 | HEIGHT: 69 IN | RESPIRATION RATE: 16 BRPM

## 2019-04-16 DIAGNOSIS — Z00.00 WELLNESS EXAMINATION: ICD-10-CM

## 2019-04-16 DIAGNOSIS — N40.0 BPH WITHOUT OBSTRUCTION/LOWER URINARY TRACT SYMPTOMS: ICD-10-CM

## 2019-04-16 DIAGNOSIS — Z00.00 WELLNESS EXAMINATION: Primary | ICD-10-CM

## 2019-04-16 DIAGNOSIS — H61.23 CERUMEN DEBRIS ON TYMPANIC MEMBRANE OF BOTH EARS: ICD-10-CM

## 2019-04-16 DIAGNOSIS — R97.20 ELEVATED PSA MEASUREMENT: ICD-10-CM

## 2019-04-16 DIAGNOSIS — R03.0 BLOOD PRESSURE ELEVATED WITHOUT HISTORY OF HTN: ICD-10-CM

## 2019-04-16 LAB
25(OH)D3 SERPL-MCNC: 39.6 NG/ML (ref 30–100)
ALBUMIN SERPL BCP-MCNC: 4 G/DL (ref 3.5–5)
ALP SERPL-CCNC: 67 U/L (ref 46–116)
ALT SERPL W P-5'-P-CCNC: 43 U/L (ref 12–78)
ANION GAP SERPL CALCULATED.3IONS-SCNC: 6 MMOL/L (ref 4–13)
AST SERPL W P-5'-P-CCNC: 21 U/L (ref 5–45)
ATRIAL RATE: 65 BPM
BACTERIA UR QL AUTO: NORMAL /HPF
BASOPHILS # BLD AUTO: 0.04 THOUSANDS/ΜL (ref 0–0.1)
BASOPHILS NFR BLD AUTO: 1 % (ref 0–1)
BILIRUB SERPL-MCNC: 1.8 MG/DL (ref 0.2–1)
BILIRUB UR QL STRIP: NEGATIVE
BUN SERPL-MCNC: 13 MG/DL (ref 5–25)
CALCIUM SERPL-MCNC: 9.6 MG/DL (ref 8.3–10.1)
CHLORIDE SERPL-SCNC: 101 MMOL/L (ref 100–108)
CHOLEST SERPL-MCNC: 176 MG/DL (ref 50–200)
CLARITY UR: CLEAR
CO2 SERPL-SCNC: 29 MMOL/L (ref 21–32)
COLOR UR: YELLOW
CREAT SERPL-MCNC: 0.92 MG/DL (ref 0.6–1.3)
CRP SERPL HS-MCNC: <0.9 MG/L
EOSINOPHIL # BLD AUTO: 0.06 THOUSAND/ΜL (ref 0–0.61)
EOSINOPHIL NFR BLD AUTO: 1 % (ref 0–6)
ERYTHROCYTE [DISTWIDTH] IN BLOOD BY AUTOMATED COUNT: 11.9 % (ref 11.6–15.1)
EST. AVERAGE GLUCOSE BLD GHB EST-MCNC: 111 MG/DL
GFR SERPL CREATININE-BSD FRML MDRD: 92 ML/MIN/1.73SQ M
GLUCOSE P FAST SERPL-MCNC: 104 MG/DL (ref 65–99)
GLUCOSE UR STRIP-MCNC: NEGATIVE MG/DL
HBA1C MFR BLD: 5.5 % (ref 4.2–6.3)
HCT VFR BLD AUTO: 49 % (ref 36.5–49.3)
HCV AB SER QL: NORMAL
HDLC SERPL-MCNC: 53 MG/DL (ref 40–60)
HGB BLD-MCNC: 17.1 G/DL (ref 12–17)
HGB UR QL STRIP.AUTO: NEGATIVE
IMM GRANULOCYTES # BLD AUTO: 0.01 THOUSAND/UL (ref 0–0.2)
IMM GRANULOCYTES NFR BLD AUTO: 0 % (ref 0–2)
KETONES UR STRIP-MCNC: NEGATIVE MG/DL
LDLC SERPL CALC-MCNC: 104 MG/DL (ref 0–100)
LEUKOCYTE ESTERASE UR QL STRIP: NEGATIVE
LYMPHOCYTES # BLD AUTO: 1.16 THOUSANDS/ΜL (ref 0.6–4.47)
LYMPHOCYTES NFR BLD AUTO: 25 % (ref 14–44)
MCH RBC QN AUTO: 31.5 PG (ref 26.8–34.3)
MCHC RBC AUTO-ENTMCNC: 34.9 G/DL (ref 31.4–37.4)
MCV RBC AUTO: 90 FL (ref 82–98)
MONOCYTES # BLD AUTO: 0.52 THOUSAND/ΜL (ref 0.17–1.22)
MONOCYTES NFR BLD AUTO: 11 % (ref 4–12)
NEUTROPHILS # BLD AUTO: 2.79 THOUSANDS/ΜL (ref 1.85–7.62)
NEUTS SEG NFR BLD AUTO: 62 % (ref 43–75)
NITRITE UR QL STRIP: NEGATIVE
NON-SQ EPI CELLS URNS QL MICRO: NORMAL /HPF
NRBC BLD AUTO-RTO: 0 /100 WBCS
P AXIS: 60 DEGREES
PH UR STRIP.AUTO: 7 [PH]
PLATELET # BLD AUTO: 198 THOUSANDS/UL (ref 149–390)
PMV BLD AUTO: 10.4 FL (ref 8.9–12.7)
POTASSIUM SERPL-SCNC: 4.2 MMOL/L (ref 3.5–5.3)
PR INTERVAL: 152 MS
PROT SERPL-MCNC: 7.5 G/DL (ref 6.4–8.2)
PROT UR STRIP-MCNC: NEGATIVE MG/DL
PSA SERPL-MCNC: 3.9 NG/ML (ref 0–4)
QRS AXIS: 67 DEGREES
QRSD INTERVAL: 88 MS
QT INTERVAL: 404 MS
QTC INTERVAL: 420 MS
RBC # BLD AUTO: 5.42 MILLION/UL (ref 3.88–5.62)
RBC #/AREA URNS AUTO: NORMAL /HPF
SODIUM SERPL-SCNC: 136 MMOL/L (ref 136–145)
SP GR UR STRIP.AUTO: <=1.005 (ref 1–1.03)
T WAVE AXIS: 47 DEGREES
TRIGL SERPL-MCNC: 96 MG/DL
TSH SERPL DL<=0.05 MIU/L-ACNC: 1.49 UIU/ML (ref 0.36–3.74)
UROBILINOGEN UR QL STRIP.AUTO: 0.2 E.U./DL
VENTRICULAR RATE: 65 BPM
WBC # BLD AUTO: 4.58 THOUSAND/UL (ref 4.31–10.16)
WBC #/AREA URNS AUTO: NORMAL /HPF

## 2019-04-16 PROCEDURE — 86141 C-REACTIVE PROTEIN HS: CPT

## 2019-04-16 PROCEDURE — 85025 COMPLETE CBC W/AUTO DIFF WBC: CPT

## 2019-04-16 PROCEDURE — 84443 ASSAY THYROID STIM HORMONE: CPT

## 2019-04-16 PROCEDURE — 93005 ELECTROCARDIOGRAM TRACING: CPT

## 2019-04-16 PROCEDURE — 84153 ASSAY OF PSA TOTAL: CPT

## 2019-04-16 PROCEDURE — 80053 COMPREHEN METABOLIC PANEL: CPT

## 2019-04-16 PROCEDURE — 86803 HEPATITIS C AB TEST: CPT

## 2019-04-16 PROCEDURE — 93306 TTE W/DOPPLER COMPLETE: CPT

## 2019-04-16 PROCEDURE — 82306 VITAMIN D 25 HYDROXY: CPT

## 2019-04-16 PROCEDURE — 99499EX: Performed by: FAMILY MEDICINE

## 2019-04-16 PROCEDURE — 81001 URINALYSIS AUTO W/SCOPE: CPT

## 2019-04-16 PROCEDURE — 80061 LIPID PANEL: CPT

## 2019-04-16 PROCEDURE — 93350 STRESS TTE ONLY: CPT

## 2019-04-16 PROCEDURE — 83036 HEMOGLOBIN GLYCOSYLATED A1C: CPT

## 2019-04-16 PROCEDURE — 36415 COLL VENOUS BLD VENIPUNCTURE: CPT

## 2019-04-16 RX ORDER — LISINOPRIL 10 MG/1
10 TABLET ORAL DAILY
Qty: 30 TABLET | Refills: 5 | Status: SHIPPED | OUTPATIENT
Start: 2019-04-16 | End: 2019-11-13 | Stop reason: SDUPTHER

## 2019-04-19 LAB
CHEST PAIN STATEMENT: NORMAL
MAX DIASTOLIC BP: 80 MMHG
MAX HEART RATE: 162 BPM
MAX PREDICTED HEART RATE: 163 BPM
MAX. SYSTOLIC BP: 212 MMHG
PROTOCOL NAME: NORMAL
REASON FOR TERMINATION: NORMAL
TARGET HR FORMULA: NORMAL
TEST INDICATION: NORMAL
TIME IN EXERCISE PHASE: NORMAL

## 2019-06-07 ENCOUNTER — OFFICE VISIT (OUTPATIENT)
Dept: FAMILY MEDICINE CLINIC | Facility: CLINIC | Age: 59
End: 2019-06-07
Payer: COMMERCIAL

## 2019-06-07 VITALS
BODY MASS INDEX: 25.77 KG/M2 | OXYGEN SATURATION: 98 % | TEMPERATURE: 97.5 F | HEIGHT: 69 IN | HEART RATE: 76 BPM | WEIGHT: 174 LBS | DIASTOLIC BLOOD PRESSURE: 84 MMHG | SYSTOLIC BLOOD PRESSURE: 124 MMHG

## 2019-06-07 DIAGNOSIS — I10 ESSENTIAL HYPERTENSION: Primary | ICD-10-CM

## 2019-06-07 DIAGNOSIS — R97.20 ELEVATED PSA MEASUREMENT: ICD-10-CM

## 2019-06-07 DIAGNOSIS — E66.3 OVERWEIGHT (BMI 25.0-29.9): ICD-10-CM

## 2019-06-07 PROCEDURE — 3008F BODY MASS INDEX DOCD: CPT | Performed by: FAMILY MEDICINE

## 2019-06-07 PROCEDURE — 1036F TOBACCO NON-USER: CPT | Performed by: FAMILY MEDICINE

## 2019-06-07 PROCEDURE — 3079F DIAST BP 80-89 MM HG: CPT | Performed by: FAMILY MEDICINE

## 2019-06-07 PROCEDURE — 99213 OFFICE O/P EST LOW 20 MIN: CPT | Performed by: FAMILY MEDICINE

## 2019-06-07 PROCEDURE — 3074F SYST BP LT 130 MM HG: CPT | Performed by: FAMILY MEDICINE

## 2019-07-31 ENCOUNTER — OFFICE VISIT (OUTPATIENT)
Dept: DERMATOLOGY | Facility: CLINIC | Age: 59
End: 2019-07-31
Payer: COMMERCIAL

## 2019-07-31 DIAGNOSIS — Z13.89 SCREENING FOR SKIN CONDITION: ICD-10-CM

## 2019-07-31 DIAGNOSIS — D22.9 NEVUS: Primary | ICD-10-CM

## 2019-07-31 DIAGNOSIS — Z85.820 HISTORY OF MELANOMA: ICD-10-CM

## 2019-07-31 PROCEDURE — 99203 OFFICE O/P NEW LOW 30 MIN: CPT | Performed by: DERMATOLOGY

## 2019-07-31 NOTE — PATIENT INSTRUCTIONS
Nevi reviewed the concept of ABCDE and ugly duckling nothing markedly atypical patient reassured  History of the melanoma probably Level 4    0 4 mm melanoma situ versus severely atypical nevus continue to monitor    Patient should be seen yearly for checkup  Screening for Dermatologic Disorders: Nothing else of concern noted on complete exam follow up in 1 year

## 2019-07-31 NOTE — PROGRESS NOTES
[facility] DERMATOLOGY  [address] [place] [id number]  [phone]  [phone]     MRN: [id number] : 1960  Encounter: [id number]  Patient Information: Javier Acevedo  Chief complaint:  Skin check    History of present illness:  73-year-old male presents for concerns regarding moles patient needs recently had a mole removed by Dr Sarai Gonsalez which was read out as a severely atypical dysplastic nevus borderline or are evolving melanoma  Biopsy showed a depth of 0 4 mm possibly  However even the staining for cells was not real clear cut confirming the diagnosis of melanoma versus nevus  Suggestion of FISH staining was also suggested  Dr Sarai Gonsalez did perform a wider excision at this point not or other remnants was noted  Past Medical History:   Diagnosis Date    Hyperlipidemia      Past Surgical History:   Procedure Laterality Date    COLONOSCOPY      COLONOSCOPY      Virtual colonoscopy - clear   KNEE ARTHROSCOPY Left 2006    Meniscus   KNEE ARTHROSCOPY Right 2016    Meniscus   [place] COLONOSCOPY FLX DX W/COLLJ SPEC WHEN PFRMD N/A 3/27/2018    Procedure: COLONOSCOPY;  Surgeon: Janet Montes MD;  Location: [facility]; Service: Gastroenterology    TONSILLECTOMY  1969     Social History   Social History     Substance and Sexual Activity   Alcohol Use Yes    Alcohol/week: 7 0 - 10 0 standard drinks    Types: 7 - 10 Standard drinks or equivalent per week     Social History     Substance and Sexual Activity   Drug Use No     Social History     Tobacco Use   Smoking Status Never Smoker   Smokeless Tobacco Never Used     Family History   Problem Relation Age of Onset    Heart disease Father     Hyperlipidemia Father     Hyperlipidemia Brother      Meds/Allergies   Allergies   Allergen Reactions    Penicillins Rash       Meds:  Prior to Admission medications    Medication Sig Start Date End Date Taking?  Authorizing Provider   calcium gluconate 500 mg tablet Take 1 tablet (500 mg total) by mouth 2 (two) times a day for 30 days 2/5/18 7/31/19 Yes Pro Navarro DO   cholecalciferol (VITAMIN D3) 1,000 units tablet Take 1 tablet (1,000 Units total) by mouth daily for 30 days 2/5/18 7/31/19 Yes Pro Navarro DO   Chromium 400 MCG TABS Take 1 tablet (400 mcg total) by mouth 2 (two) times a day for 30 days 2/5/18 7/31/19 Yes Pro Navarro DO   lisinopril (ZESTRIL) 10 mg tablet Take 1 tablet (10 mg total) by mouth daily 4/16/19  Yes Pro Navarro DO   Omega 3 1200 MG CAPS Take 1 capsule (1,200 mg total) by mouth 2 (two) times a day for 30 days 2/5/18 7/31/19 Yes Pro Navarro DO       Subjective:     Review of Systems:    General: negative for - chills, fatigue, fever,  weight gain or weight loss  Psychological: negative for - anxiety, behavioral disorder, concentration difficulties, decreased libido, depression, irritability, memory difficulties, mood swings, sleep disturbances or suicidal ideation  ENT: negative for - hearing difficulties , nasal congestion, nasal discharge, oral lesions, sinus pain, sneezing, sore throat  Allergy and Immunology: negative for - hives, insect bite sensitivity,  Hematological and Lymphatic: negative for - bleeding problems, blood clots,bruising, swollen lymph nodes  Endocrine: negative for - hair pattern changes, hot flashes, malaise/lethargy, mood swings, palpitations, polydipsia/polyuria, skin changes, temperature intolerance or unexpected weight change  Respiratory: negative for - cough, hemoptysis, orthopnea, shortness of breath, or wheezing  Cardiovascular: negative for - chest pain, dyspnea on exertion, edema,  Gastrointestinal: negative for - abdominal pain, nausea/vomiting  Genito-Urinary: negative for - dysuria, incontinence, irregular/heavy menses or urinary frequency/urgency  Musculoskeletal: negative for - gait disturbance, joint pain, joint stiffness, joint swelling, muscle pain, muscular weakness  Dermatological:  As in HPI  Neurological: negative for confusion, dizziness, headaches, impaired coordination/balance, memory loss, numbness/tingling, seizures, speech problems, tremors or weakness       Objective: There were no vitals taken for this visit  Physical Exam:    General Appearance:    Alert, cooperative, no distress   Head:    Normocephalic, without obvious abnormality, atraumatic           Skin:   A full skin exam was performed including scalp, head scalp, eyes, ears, nose, lips, neck, chest, axilla, abdomen, back, buttocks, bilateral upper extremities, bilateral lower extremities, hands, feet, fingers, toes, fingernails, and toenails normal pigmented lesions all regular shape and color only little larger 1 that is noted at this point is 1 on the left posterior thigh that that measures about 6 mm size nothing else remarkable noted on exam       Assessment:       1  Nevus     2  Screening for skin condition     3  History of melanoma           Plan:   Nevi reviewed the concept of ABCDE and ugly duckling nothing markedly atypical patient reassured  History of the melanoma probably Level 4    0 4 mm melanoma situ versus severely atypical nevus continue to monitor  Patient should be seen yearly for checkup  Screening for Dermatologic Disorders: Nothing else of concern noted on complete exam follow up in 1 year       Evelia Sumner MD  7/31/2019,1:12 PM    Portions of the record may have been created with voice recognition software   Occasional wrong word or "sound a like" substitutions may have occurred due to the inherent limitations of voice recognition software   Read the chart carefully and recognize, using context, where substitutions have occurred

## 2019-11-13 DIAGNOSIS — R03.0 BLOOD PRESSURE ELEVATED WITHOUT HISTORY OF HTN: ICD-10-CM

## 2019-11-13 RX ORDER — LISINOPRIL 10 MG/1
TABLET ORAL
Qty: 30 TABLET | Refills: 5 | Status: SHIPPED | OUTPATIENT
Start: 2019-11-13 | End: 2020-03-14

## 2020-03-14 DIAGNOSIS — R03.0 BLOOD PRESSURE ELEVATED WITHOUT HISTORY OF HTN: ICD-10-CM

## 2020-03-14 RX ORDER — LISINOPRIL 10 MG/1
TABLET ORAL
Qty: 90 TABLET | Refills: 1 | Status: SHIPPED | OUTPATIENT
Start: 2020-03-14 | End: 2020-09-14

## 2020-08-05 ENCOUNTER — OFFICE VISIT (OUTPATIENT)
Dept: DERMATOLOGY | Facility: CLINIC | Age: 60
End: 2020-08-05
Payer: COMMERCIAL

## 2020-08-05 VITALS — TEMPERATURE: 97.2 F

## 2020-08-05 DIAGNOSIS — Z85.820 HISTORY OF MELANOMA: ICD-10-CM

## 2020-08-05 DIAGNOSIS — Z13.89 SCREENING FOR SKIN CONDITION: ICD-10-CM

## 2020-08-05 DIAGNOSIS — D22.9 NEVUS: Primary | ICD-10-CM

## 2020-08-05 PROCEDURE — 3074F SYST BP LT 130 MM HG: CPT | Performed by: DERMATOLOGY

## 2020-08-05 PROCEDURE — 3079F DIAST BP 80-89 MM HG: CPT | Performed by: DERMATOLOGY

## 2020-08-05 PROCEDURE — 1036F TOBACCO NON-USER: CPT | Performed by: DERMATOLOGY

## 2020-08-05 PROCEDURE — 99213 OFFICE O/P EST LOW 20 MIN: CPT | Performed by: DERMATOLOGY

## 2020-08-05 NOTE — PATIENT INSTRUCTIONS
Nevi reviewed the concept of ABCDE and ugly duckling nothing markedly atypical patient reassured  History of the melanoma probably Level 4    0 4 mm melanoma situ versus severely atypical nevus continue to monitor    Patient should be seen yearly for checkup  Screening for Dermatologic Disorders: Nothing else of concern noted on complete exam follow up in 6 months

## 2020-08-05 NOTE — PROGRESS NOTES
[address]  [facility] [address]  [place] [address]  [phone]  [phone]     MRN: [id number] : 1960  Encounter: [id number]  Patient Information: Niya Richey  Chief complaint:  Yearly check up    History of present illness:  23-year-old male with previous history of atypical nevus question early melanoma on the left knee will removed previously by Dr Hailee Lyles  Presents for overall checkup concerned regarding several moles and overall concerns regarding skin cancer  Past Medical History:   Diagnosis Date    Hyperlipidemia      Past Surgical History:   Procedure Laterality Date    COLONOSCOPY      COLONOSCOPY      Virtual colonoscopy - clear   KNEE ARTHROSCOPY Left     Meniscus   KNEE ARTHROSCOPY Right 2016    Meniscus   [place] COLONOSCOPY FLX DX W/COLLJ SPEC WHEN PFRMD N/A 3/27/2018    Procedure: COLONOSCOPY;  Surgeon: Bowen Morrell MD;  Location: [facility]; Service: Gastroenterology    TONSILLECTOMY       Social History   Social History     Substance and Sexual Activity   Alcohol Use Yes    Alcohol/week: 7 0 - 10 0 standard drinks    Types: 7 - 10 Standard drinks or equivalent per week     Social History     Substance and Sexual Activity   Drug Use No     Social History     Tobacco Use   Smoking Status Never Smoker   Smokeless Tobacco Never Used     Family History   Problem Relation Age of Onset    Heart disease Father     Hyperlipidemia Father     Hyperlipidemia Brother      Meds/Allergies   Allergies   Allergen Reactions    Penicillins Rash       Meds:  Prior to Admission medications    Medication Sig Start Date End Date Taking?  Authorizing Provider   calcium gluconate 500 mg tablet Take 1 tablet (500 mg total) by mouth 2 (two) times a day for 30 days 18 Yes Pro Navarro DO   cholecalciferol (VITAMIN D3) 1,000 units tablet Take 1 tablet (1,000 Units total) by mouth daily for 30 days 18 8/5/20 Yes Pro Navarro DO   lisinopril (ZESTRIL) 10 mg tablet TAKE 1 TABLET BY MOUTH EVERY DAY 3/14/20  Yes Pro Navarro DO   Omega 3 1200 MG CAPS Take 1 capsule (1,200 mg total) by mouth 2 (two) times a day for 30 days 2/5/18 8/5/20 Yes Pro Navarro DO   Chromium 400 MCG TABS Take 1 tablet (400 mcg total) by mouth 2 (two) times a day for 30 days  Patient not taking: Reported on 8/5/2020 2/5/18 7/31/19  Pro Alfaro DO       Subjective:     Review of Systems:    General: negative for - chills, fatigue, fever,  weight gain or weight loss  Psychological: negative for - anxiety, behavioral disorder, concentration difficulties, decreased libido, depression, irritability, memory difficulties, mood swings, sleep disturbances or suicidal ideation  ENT: negative for - hearing difficulties , nasal congestion, nasal discharge, oral lesions, sinus pain, sneezing, sore throat  Allergy and Immunology: negative for - hives, insect bite sensitivity,  Hematological and Lymphatic: negative for - bleeding problems, blood clots,bruising, swollen lymph nodes  Endocrine: negative for - hair pattern changes, hot flashes, malaise/lethargy, mood swings, palpitations, polydipsia/polyuria, skin changes, temperature intolerance or unexpected weight change  Respiratory: negative for - cough, hemoptysis, orthopnea, shortness of breath, or wheezing  Cardiovascular: negative for - chest pain, dyspnea on exertion, edema,  Gastrointestinal: negative for - abdominal pain, nausea/vomiting  Genito-Urinary: negative for - dysuria, incontinence, irregular/heavy menses or urinary frequency/urgency  Musculoskeletal: negative for - gait disturbance, joint pain, joint stiffness, joint swelling, muscle pain, muscular weakness  Dermatological:  As in HPI  Neurological: negative for confusion, dizziness, headaches, impaired coordination/balance, memory loss, numbness/tingling, seizures, speech problems, tremors or weakness       Objective: Temp (!) 97 2 °F (36 2 °C)     Physical Exam:    General Appearance:    Alert, cooperative, no distress   Head:    Normocephalic, without obvious abnormality, atraumatic           Skin:   A full skin exam was performed including scalp, head scalp, eyes, ears, nose, lips, neck, chest, axilla, abdomen, back, buttocks, bilateral upper extremities, bilateral lower extremities, hands, feet, fingers, toes, fingernails, and toenails normal pigmented lesions all regular shape color previous site of excision of well-healed without recurrence nothing else remarkable noted on complete exam     Assessment:     1  Nevus     2  Screening for skin condition     3  History of melanoma           Plan:   Nevi reviewed the concept of ABCDE and ugly duckling nothing markedly atypical patient reassured  History of the melanoma probably Level 4    0 4 mm melanoma situ versus severely atypical nevus continue to monitor  Patient should be seen yearly for checkup  Screening for Dermatologic Disorders: Nothing else of concern noted on complete exam follow up in 6 months  Kt Michele MD  8/5/2020,8:17 AM    Portions of the record may have been created with voice recognition software   Occasional wrong word or "sound a like" substitutions may have occurred due to the inherent limitations of voice recognition software   Read the chart carefully and recognize, using context, where substitutions have occurred

## 2020-09-02 DIAGNOSIS — Z00.00 WELLNESS EXAMINATION: Primary | ICD-10-CM

## 2020-09-14 DIAGNOSIS — R03.0 BLOOD PRESSURE ELEVATED WITHOUT HISTORY OF HTN: ICD-10-CM

## 2020-09-14 RX ORDER — LISINOPRIL 10 MG/1
TABLET ORAL
Qty: 90 TABLET | Refills: 1 | Status: SHIPPED | OUTPATIENT
Start: 2020-09-14 | End: 2020-09-17 | Stop reason: SDUPTHER

## 2020-09-16 ENCOUNTER — TELEPHONE (OUTPATIENT)
Dept: FAMILY MEDICINE CLINIC | Facility: CLINIC | Age: 60
End: 2020-09-16

## 2020-09-16 PROBLEM — R73.01 IMPAIRED FASTING GLUCOSE: Status: ACTIVE | Noted: 2020-09-16

## 2020-09-16 PROBLEM — H61.23 BILATERAL IMPACTED CERUMEN: Status: RESOLVED | Noted: 2018-02-06 | Resolved: 2020-09-16

## 2020-09-16 PROBLEM — D23.9 DYSPLASTIC NEVUS: Status: ACTIVE | Noted: 2019-07-01

## 2020-09-17 ENCOUNTER — LAB (OUTPATIENT)
Dept: LAB | Facility: CLINIC | Age: 60
End: 2020-09-17

## 2020-09-17 ENCOUNTER — TRANSCRIBE ORDERS (OUTPATIENT)
Dept: LAB | Facility: CLINIC | Age: 60
End: 2020-09-17

## 2020-09-17 ENCOUNTER — HOSPITAL ENCOUNTER (OUTPATIENT)
Dept: ULTRASOUND IMAGING | Facility: HOSPITAL | Age: 60
Discharge: HOME | End: 2020-09-17

## 2020-09-17 ENCOUNTER — HOSPITAL ENCOUNTER (OUTPATIENT)
Dept: NON INVASIVE DIAGNOSTICS | Facility: CLINIC | Age: 60
Discharge: HOME | End: 2020-09-17

## 2020-09-17 ENCOUNTER — OFFICE VISIT (OUTPATIENT)
Dept: FAMILY MEDICINE CLINIC | Facility: CLINIC | Age: 60
End: 2020-09-17

## 2020-09-17 VITALS
HEART RATE: 75 BPM | DIASTOLIC BLOOD PRESSURE: 72 MMHG | HEIGHT: 69 IN | SYSTOLIC BLOOD PRESSURE: 130 MMHG | RESPIRATION RATE: 12 BRPM | WEIGHT: 171.8 LBS | OXYGEN SATURATION: 98 % | BODY MASS INDEX: 25.45 KG/M2

## 2020-09-17 DIAGNOSIS — Z00.00 WELLNESS EXAMINATION: ICD-10-CM

## 2020-09-17 DIAGNOSIS — E80.4 GILBERT'S SYNDROME: ICD-10-CM

## 2020-09-17 DIAGNOSIS — R03.0 BLOOD PRESSURE ELEVATED WITHOUT HISTORY OF HTN: ICD-10-CM

## 2020-09-17 DIAGNOSIS — E78.49 OTHER HYPERLIPIDEMIA: ICD-10-CM

## 2020-09-17 DIAGNOSIS — I10 ESSENTIAL HYPERTENSION: ICD-10-CM

## 2020-09-17 DIAGNOSIS — R73.01 IMPAIRED FASTING GLUCOSE: Primary | ICD-10-CM

## 2020-09-17 DIAGNOSIS — D23.9 DYSPLASTIC NEVUS: ICD-10-CM

## 2020-09-17 DIAGNOSIS — R97.20 ELEVATED PSA MEASUREMENT: ICD-10-CM

## 2020-09-17 DIAGNOSIS — E87.1 HYPONATREMIA: ICD-10-CM

## 2020-09-17 DIAGNOSIS — N40.0 BPH WITHOUT OBSTRUCTION/LOWER URINARY TRACT SYMPTOMS: ICD-10-CM

## 2020-09-17 LAB
25(OH)D3 SERPL-MCNC: 40.6 NG/ML (ref 30–100)
ALBUMIN SERPL BCP-MCNC: 4.2 G/DL (ref 3.5–5)
ALP SERPL-CCNC: 56 U/L (ref 46–116)
ALT SERPL W P-5'-P-CCNC: 42 U/L (ref 12–78)
ANION GAP SERPL CALCULATED.3IONS-SCNC: 8 MMOL/L (ref 4–13)
AST SERPL W P-5'-P-CCNC: 21 U/L (ref 5–45)
ATRIAL RATE: 67 BPM
BACTERIA UR QL AUTO: ABNORMAL /HPF
BASOPHILS # BLD AUTO: 0.05 THOUSANDS/ΜL (ref 0–0.1)
BASOPHILS NFR BLD AUTO: 1 % (ref 0–1)
BILIRUB SERPL-MCNC: 2.11 MG/DL (ref 0.2–1)
BILIRUB UR QL STRIP: NEGATIVE
BUN SERPL-MCNC: 17 MG/DL (ref 5–25)
CALCIUM SERPL-MCNC: 9.1 MG/DL (ref 8.3–10.1)
CHLORIDE SERPL-SCNC: 98 MMOL/L (ref 100–108)
CHOLEST SERPL-MCNC: 232 MG/DL (ref 50–200)
CLARITY UR: CLEAR
CO2 SERPL-SCNC: 27 MMOL/L (ref 21–32)
COLOR UR: ABNORMAL
CREAT SERPL-MCNC: 0.98 MG/DL (ref 0.6–1.3)
CRP SERPL HS-MCNC: 1.3 MG/L
EOSINOPHIL # BLD AUTO: 0.09 THOUSAND/ΜL (ref 0–0.61)
EOSINOPHIL NFR BLD AUTO: 2 % (ref 0–6)
ERYTHROCYTE [DISTWIDTH] IN BLOOD BY AUTOMATED COUNT: 11.9 % (ref 11.6–15.1)
EST. AVERAGE GLUCOSE BLD GHB EST-MCNC: 100 MG/DL
GFR SERPL CREATININE-BSD FRML MDRD: 84 ML/MIN/1.73SQ M
GLUCOSE P FAST SERPL-MCNC: 102 MG/DL (ref 65–99)
GLUCOSE UR STRIP-MCNC: NEGATIVE MG/DL
HBA1C MFR BLD: 5.1 %
HCT VFR BLD AUTO: 47.1 % (ref 36.5–49.3)
HDLC SERPL-MCNC: 68 MG/DL
HGB BLD-MCNC: 16.4 G/DL (ref 12–17)
HGB UR QL STRIP.AUTO: NEGATIVE
IMM GRANULOCYTES # BLD AUTO: 0.01 THOUSAND/UL (ref 0–0.2)
IMM GRANULOCYTES NFR BLD AUTO: 0 % (ref 0–2)
KETONES UR STRIP-MCNC: NEGATIVE MG/DL
LDLC SERPL CALC-MCNC: 149 MG/DL (ref 0–100)
LEUKOCYTE ESTERASE UR QL STRIP: NEGATIVE
LYMPHOCYTES # BLD AUTO: 1.28 THOUSANDS/ΜL (ref 0.6–4.47)
LYMPHOCYTES NFR BLD AUTO: 23 % (ref 14–44)
MCH RBC QN AUTO: 32.5 PG (ref 26.8–34.3)
MCHC RBC AUTO-ENTMCNC: 34.8 G/DL (ref 31.4–37.4)
MCV RBC AUTO: 93 FL (ref 82–98)
MONOCYTES # BLD AUTO: 0.68 THOUSAND/ΜL (ref 0.17–1.22)
MONOCYTES NFR BLD AUTO: 12 % (ref 4–12)
NEUTROPHILS # BLD AUTO: 3.44 THOUSANDS/ΜL (ref 1.85–7.62)
NEUTS SEG NFR BLD AUTO: 62 % (ref 43–75)
NITRITE UR QL STRIP: NEGATIVE
NON-SQ EPI CELLS URNS QL MICRO: ABNORMAL /HPF
NRBC BLD AUTO-RTO: 0 /100 WBCS
P AXIS: 59 DEGREES
PH UR STRIP.AUTO: 5.5 [PH]
PLATELET # BLD AUTO: 210 THOUSANDS/UL (ref 149–390)
PMV BLD AUTO: 10.3 FL (ref 8.9–12.7)
POTASSIUM SERPL-SCNC: 4 MMOL/L (ref 3.5–5.3)
PR INTERVAL: 152 MS
PROT SERPL-MCNC: 7.6 G/DL (ref 6.4–8.2)
PROT UR STRIP-MCNC: NEGATIVE MG/DL
PSA SERPL-MCNC: 2.5 NG/ML (ref 0–4)
QRS AXIS: 67 DEGREES
QRSD INTERVAL: 88 MS
QT INTERVAL: 402 MS
QTC INTERVAL: 424 MS
RBC # BLD AUTO: 5.05 MILLION/UL (ref 3.88–5.62)
RBC #/AREA URNS AUTO: ABNORMAL /HPF
SODIUM SERPL-SCNC: 133 MMOL/L (ref 136–145)
SP GR UR STRIP.AUTO: <=1.005 (ref 1–1.03)
T WAVE AXIS: 58 DEGREES
TRIGL SERPL-MCNC: 74 MG/DL
TSH SERPL DL<=0.05 MIU/L-ACNC: 1.27 UIU/ML (ref 0.36–3.74)
UROBILINOGEN UR QL STRIP.AUTO: 0.2 E.U./DL
VENTRICULAR RATE: 67 BPM
WBC # BLD AUTO: 5.55 THOUSAND/UL (ref 4.31–10.16)
WBC #/AREA URNS AUTO: ABNORMAL /HPF

## 2020-09-17 PROCEDURE — 84443 ASSAY THYROID STIM HORMONE: CPT

## 2020-09-17 PROCEDURE — 93005 ELECTROCARDIOGRAM TRACING: CPT

## 2020-09-17 PROCEDURE — 85025 COMPLETE CBC W/AUTO DIFF WBC: CPT

## 2020-09-17 PROCEDURE — 99499EX: Performed by: INTERNAL MEDICINE

## 2020-09-17 PROCEDURE — 76700 US EXAM ABDOM COMPLETE: CPT

## 2020-09-17 PROCEDURE — 93351 STRESS TTE COMPLETE: CPT | Performed by: INTERNAL MEDICINE

## 2020-09-17 PROCEDURE — 84153 ASSAY OF PSA TOTAL: CPT

## 2020-09-17 PROCEDURE — 93306 TTE W/DOPPLER COMPLETE: CPT | Performed by: INTERNAL MEDICINE

## 2020-09-17 PROCEDURE — 93010 ELECTROCARDIOGRAM REPORT: CPT | Performed by: INTERNAL MEDICINE

## 2020-09-17 PROCEDURE — 86141 C-REACTIVE PROTEIN HS: CPT

## 2020-09-17 PROCEDURE — 80053 COMPREHEN METABOLIC PANEL: CPT

## 2020-09-17 PROCEDURE — 36415 COLL VENOUS BLD VENIPUNCTURE: CPT

## 2020-09-17 PROCEDURE — 93306 TTE W/DOPPLER COMPLETE: CPT

## 2020-09-17 PROCEDURE — 81001 URINALYSIS AUTO W/SCOPE: CPT

## 2020-09-17 PROCEDURE — VASC: Performed by: SURGERY

## 2020-09-17 PROCEDURE — 80061 LIPID PANEL: CPT

## 2020-09-17 PROCEDURE — 82306 VITAMIN D 25 HYDROXY: CPT

## 2020-09-17 PROCEDURE — 93922 UPR/L XTREMITY ART 2 LEVELS: CPT

## 2020-09-17 PROCEDURE — 83036 HEMOGLOBIN GLYCOSYLATED A1C: CPT

## 2020-09-17 PROCEDURE — 93350 STRESS TTE ONLY: CPT

## 2020-09-17 RX ORDER — LISINOPRIL 10 MG/1
10 TABLET ORAL DAILY
Qty: 90 TABLET | Refills: 1 | Status: SHIPPED | OUTPATIENT
Start: 2020-09-17 | End: 2021-03-15 | Stop reason: SDUPTHER

## 2020-09-17 NOTE — ASSESSMENT & PLAN NOTE
Total Bilirubin   Date Value Ref Range Status   09/17/2020 2 11 (H) 0 20 - 1 00 mg/dL Final   04/16/2019 1 80 (H) 0 20 - 1 00 mg/dL Final   02/06/2018 1 60 (H) 0 20 - 1 00 mg/dL Final     Your bilirubin is always a little elevated  Your other liver tests and your liver ultrasound are normal  We could check a bilirubin level in a non-fasting state to confirm this

## 2020-09-17 NOTE — ASSESSMENT & PLAN NOTE
No evidence of recurrence of the lesion on your left thigh and no significant lesions on exam  Continue annual dermatology follow up and notify your dermatologist of any rapidly growing lesions or any lesions that are particularly dark, multi-colored, or irregularly-shaped

## 2020-09-17 NOTE — ASSESSMENT & PLAN NOTE
Glucose, Fasting   Date Value Ref Range Status   09/17/2020 102 (H) 65 - 99 mg/dL Final   04/16/2019 104 (H) 65 - 99 mg/dL Final   02/06/2018 108 (H) 65 - 99 mg/dL Final   Your fasting blood sugar is a little elevated but has dropped compared to your last 2 readings  The best thing to do to lower blood sugar to normal is limited carbohydrates, lose weight, and keep exercising

## 2020-09-17 NOTE — ASSESSMENT & PLAN NOTE
Sodium   Date Value Ref Range Status   09/17/2020 133 (L) 136 - 145 mmol/L Final   04/16/2019 136 136 - 145 mmol/L Final   02/06/2018 140 136 - 145 mmol/L Final   Your sodium is a little low today  This could be related to being in the fasting state and having drunk a lot of water this morning  I would recommend repeating this test in a non-fasting state

## 2020-09-17 NOTE — PROGRESS NOTES
Fitness Summary and Recommendations:  Charmaine Tellez scored at the 95% on his body composition assessment with a bodyfat % of 14 5%  Charmaine Tellez scored in the suboptimal range for flexibility with a sit & reach score of 9 cm  His Muscle Strength/Endurance scores placed him at the 95% (lower body) and 95% (upper body) with a chair stand score of 33 and arm curl score of 40 respectively  Cristians Cardiovascular Score of 49 5 placed him at the 95%  Overall Charmaine Tellez would be considered to have an above average fitness level with a 78% score  His overall fitness score has increased by 9% from his previous test on 02/05/18  Continued emphasis on regular exercise and sound nutrition practices will enable Charmaine Tellez to reach his optimal level of fitness

## 2020-09-17 NOTE — ASSESSMENT & PLAN NOTE
No new symptoms  Whether saw palmetto works for prostate health is unclear  It's up to you whether to keep taking it

## 2020-09-17 NOTE — PROGRESS NOTES
Hearing Assessment Summary:     Hearing Screening    125Hz 250Hz 500Hz 1000Hz 2000Hz 3000Hz 4000Hz 6000Hz 8000Hz   Right ear:  13 15 15 40 10 20 15 25   Left ear:  20 15 20 35 25 40 40 45   Comments: HEARING EVALUATION    Name:  Rupert Maguire  :  1960  Age:  62 y o  Date of Evaluation: 20     History: [employer] Exam  Reason for visit: Rupert Maguire is being seen today for an evaluation of hearing as part of his [employer] physical   Patient reports no concern over hearing  EVALUATION:    Otoscopic Evaluation:   Right Ear: Moderate cerumen   Left Ear: Moderate cerumen    Tympanometry:   Right: Type A - normal middle ear pressure and compliance   Left: Type A - normal middle ear pressure and compliance    Audiogram Results:  Right: Normal except for mild sensorineural hearing loss at 1500 and 2k Hz  Left: Normal through 1k Hz with mild mixed hearing loss thereafter  Stable from last evaluation  *see attached audiogram      RECOMMENDATIONS:  Annual hearing eval, Return to [facility]  for F/U and Ear Cleaning    PATIENT EDUCATION:   Discussed results and recommendations with patient  Questions were addressed and the patient was encouraged to contact our department should concerns arise        Mi Garcia   Clinical Audiologist       Visual Acuity Screening    Right eye Left eye Both eyes   Without correction: 20/40 20/30    With correction: 20/20 20/30

## 2020-09-17 NOTE — ASSESSMENT & PLAN NOTE
BP Readings from Last 3 Encounters:   09/17/20 130/72   06/07/19 124/84   04/16/19 142/80   Your blood pressure is better-controlled now that you are on lisinopril  Your potassium and kidney function are normal (we monitor these for patients on lisinopril)  We discussed what an optimal blood pressure should be  There are conflicting recommendations from national guidelines  Your measurement today is acceptable  It does appear that stroke risks are lower with lower blood pressures (even within what are considered normal ranges)  However, as a practical matter many doctors are choosing the target of <140/90

## 2020-09-17 NOTE — PROGRESS NOTES
Nutritional Summary and Recommendations:   Kim Collazo presents for follow up nutrition assessment and counseling  Reviewed previous nutrition plan and goals  Haley Torre has improved his fat mass by a healthy loss of 14 lb in 2 years  Discussion focused on benefits of plant based foods, improving LDL levels and noticeable improvement in body composition in relation to fat mass  HT: 69 in, WT: 168 8lb, BMI 24 9, Tanita BMR 1601 kcal, Fat Mass 24 4lb,  4lb, Desired Fat Mass 14-16%  Meds: reviewed  Labs: Vitamin D 40, Chol 232, Trig 74, HDL 68, , HgbA1C 5 1, Glu 102  Nutrition Diagnoses: Altered nutrition related lab value related to physiological events as evidenced by elevated LDL  Goals:  1  Consume 2-3 serving fresh/frozen fruit and 4-6 Cups fresh/frozen vegetables daily  2  Include whole grains seeds and nuts (limit nuts to 1/4 cup serving)  3  Achieve LDL <149 by next lab assessment  4  Maintain body composition of 14-16% body fat by next body composition assessment   5  Consume at least 15-20 gram at breakfast daily as discussed with RD (4 egg whites/Greek yogurt/ Protein Shake)  6  Contact RD with any questions/concerns    Perceived Comprehension: Very Good                      Expected Compliance: Very Good

## 2020-09-17 NOTE — ASSESSMENT & PLAN NOTE
Lab Results   Component Value Date    CHOLESTEROL 232 (H) 09/17/2020    CHOLESTEROL 176 04/16/2019    CHOLESTEROL 224 (H) 02/06/2018    TRIG 74 09/17/2020    TRIG 96 04/16/2019    TRIG 144 02/06/2018    HDL 68 09/17/2020    HDL 53 04/16/2019    HDL 71 (H) 02/06/2018    LDLCALC 149 (H) 09/17/2020    LDLCALC 104 (H) 04/16/2019    LDLCALC 124 (H) 02/06/2018   Your cholesterol has increased since last year  Your LDL (bad cholesterol) has also jumped  This tends to respond to increasing fiber intake and reducing intake of animal fat and dairy  You have an excellent HDL (good cholesterol) which tends to protect you from heart disease

## 2020-09-17 NOTE — PROGRESS NOTES
Heart and Vascular Summary:     1  Baseline EKG:   Normal sinus rhythm, normal ECG  2  Echocardiogram:  Normal right and left ventricular size and function  Left ventricular ejection fraction (EF) was 60%  There were no significant valve problems  Normal study  3  Stress Echocardiogram: Good exercise capacity (12 mins 31 secs), achieving 14 3 METS, and 95% of maximal predicted heart rate  You had no significant changes during stress that suggest any ischemia or blockages over 50%  Blood pressure was normal at the start of the test, with a normal response to exercise (peak blood pressure of 628 systolic)  You had a good heart rate and blood pressure recovery after exercise  Based on the Duke Treadmill score, there is a low risk for cardiac events  Normal baseline left ventricular wall motion and function on echocardiogram, with no significant wall motion abnormalities or reduction in function with peak exercise  This confirms that there are no functionally significant blockages over 50% in the coronary arteries at this time  4  Abdominal Ultrasound: This revealed a normal sized abdominal aorta (screen for aneurysms)  5  Lipid Profile: this revealed total cholesterol of 232, LDL of 149, HDL of 68 and Triglyceride level of 74  The total cholesterol is a sum of its individual parts  The HDL is the good cholesterol, which is protective to your heart  Your level of 68 is very good, with a value greater than 40 to be at goal   This is reflective of your genetics and activity level  The Triglycerides are a marker of your diet and genetics as well  Less than 150 is what we aim for, and your level of 74 is very good  The LDL is the bad cholesterol, the cholesterol that builds atherosclerotic plaque in your arteries  The level of 149 is elevated, with a value of less than 120 to optimize cardiovascular risk  6  Coronary calcium score: Total coronary calcium score was 0 in 2018    This does not rule out the possibility of soft plaque buildup in the coronary arteries that happens naturally over time  The score is very low  7  10-year calculated cardiovascular risk: using the ACC/AHA ASCVD Risk Calculator for 10 year risk of heart disease or stroke, your percentage came to 8 7%, which is intermediate  The optimal risk for your age would be 5 2%  There is no indication to start on a daily baby aspirin, but a statin is recommended to reduce the LDL  Lifestyle modifications including a heart-conscious diet, exercise regimen, and remaining a non-smoker are recommended to manage cardiovascular health

## 2020-09-17 NOTE — PATIENT INSTRUCTIONS
Get non-fastin basic metabolic panel and liver tests in the next few weeks  Get fasting lipid panel in 3 months

## 2020-09-17 NOTE — PROGRESS NOTES
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 which involved the margins of the excised tissue  He underwent a wider excision July 2019 by plastic surgery which only revealed a scar and no residual melanocytic proliferation  He was then seen by dermatology for a skin check with no new lesions identified  He followed up recently with dermatology  He doesn't have any new skin lesions  He was also started on lisinopril 10 mg daily for hypertension which he has tolerated well  He requested a refill of lisinopril today  He recently got progressive glasses and is still adjusting to them  Past Medical History:   Diagnosis Date    Dysplastic nevus, severe vs  early melanoma 7/1/2019    Essential hypertension 2/6/2018    Hyperlipidemia     Impaired fasting glucose 9/16/2020        Past Surgical History:   Procedure Laterality Date    COLONOSCOPY  2001    COLONOSCOPY  2010    Virtual colonoscopy - clear   KNEE ARTHROSCOPY Left 2006    Meniscus   KNEE ARTHROSCOPY Right 2016    Meniscus   [place] COLONOSCOPY FLX DX W/COLLJ SPEC WHEN PFRMD N/A 3/27/2018    Procedure: COLONOSCOPY;  Surgeon: Donna Rivera MD;  Location: [facility]; Service: Gastroenterology    TONSILLECTOMY  1969        Family History   Problem Relation Age of Onset    Heart disease Father     Hyperlipidemia Father     Hyperlipidemia Brother         Social History     Socioeconomic History    Marital status:       Spouse name: Not on file    Number of children: 2    Years of education: Not on file    Highest education level: Not on file   Occupational History    Occupation: [employer], [employer]      Comment: [employer] resource strain: Not on file    Food insecurity     Worry: Not on file     Inability: Not on file   [employer] needs     Medical: Not on file     Non-medical: Not on file   Tobacco Use    Smoking status: Never Smoker    Smokeless tobacco: Never Used   Substance and Sexual Activity    Alcohol use: Yes     Alcohol/week: 7 0 - 10 0 standard drinks     Types: 7 - 10 Standard drinks or equivalent per week     Frequency: 4 or more times a week     Drinks per session: 1 or 2    Drug use: No    Sexual activity: Yes     Partners: Female   Lifestyle    Physical activity     Days per week: 5 days     Minutes per session: 40 min    Stress: Rather much - COVID-19 has increased demands at work  Social History Narrative    [employer],  [employer]    Patient exercises 4 - 5 times per week (treadmill, weights, weight machine, calisthenics)  Patient exercises 4 - 5 times per week (treadmill, weights, weight machine, calisthenics)  Medication Sig    calcium gluconate 500 mg tablet Take 1 tablet (500 mg total) by mouth 2 (two) times a day for 30 days    cholecalciferol (VITAMIN D3) 1,000 units tablet Take 1 tablet (1,000 Units total) by mouth daily for 30 days    Chromium 400 MCG TABS Take 1 tablet (400 mcg total) by mouth 2 (two) times a day for 30 days (Patient not taking: Reported on 8/5/2020)    lisinopril (ZESTRIL) 10 mg tablet TAKE 1 TABLET BY MOUTH EVERY DAY    Omega 3 1200 MG CAPS Take 1 capsule (1,200 mg total) by mouth 2 (two) times a day for 30 days          Allergies   Allergen Reactions    Penicillins Rash       Review of Systems   Constitutional: Negative for chills, diaphoresis, fever and unexpected weight change  HENT: Negative for congestion and postnasal drip      Eyes: Positive for visual disturbance (adujsting to new glasses)  Respiratory: Negative for cough, shortness of breath and wheezing  Cardiovascular: Negative for chest pain and leg swelling  Gastrointestinal: Negative for abdominal pain, blood in stool, constipation and diarrhea  Genitourinary: Negative for dysuria and hematuria  Gets up once a night to urinate (stable)  No erectile dysfunction  Musculoskeletal: Negative for arthralgias  Skin: Negative for rash  Neurological: Negative for speech difficulty and weakness  Hematological: Negative for adenopathy  Does not bruise/bleed easily  Vitals:    09/17/20 1138 09/17/20 1141   BP: 134/58 130/72   BP Location: Right arm Left arm   Patient Position: Sitting Sitting   Cuff Size: Large Large   Pulse: 75    Resp: 12    SpO2: 98%    Weight: 77 9 kg (171 lb 12 8 oz)    Height: 5' 9" (1 753 m)          General:  Well-developed, well-nourished, in no acute distress  Skin:  Warm, moist, no significant lesions  Well-healed scare left posterior shoulder  Scar left distal anterior thigh well-healed without pigmented lesions  No significant lesions noted on face, back, arms, legs, or buttocks  HENT:  Normocephalic, atraumatic, tympanic membranes partly visible bilaterally without significant findings, ear canals had dry cerumen bilaterally not amenable to removal with plastic loop, nasal mucosa without lesions, oropharynx was clear without exudate  Eyes: PERRL, EOMI, conjunctivae normal  Early cataracts bilaterally     Neck:  No thyromegaly, thyroid nodules or cervical lymphadenopathy  Cardiac:  Regular rate and rhythm, no murmur, gallop, or rub  There is no JVD or HJR  Lungs:  Clear to auscultation and percussion  Abdomen:  Soft, nontender, normoactive bowel sounds, no palpable masses, no hepatosplenomegaly  :  Testes were descended bilaterally without masses or tenderness, penis was circumcised without lesions or discharge    Rectal: We decided against rectal exam today   Musculoskeletal:  No clubbing, cyanosis, or edema  Neurologic:  Cranial nerves 2-12 intact, motor was 5/5 in all major groups, cerebellar was intact to finger to nose  Psychiatric:  Mood bright, appropriate affect and insight  Assessment/Plan:    Gilbert's syndrome  Total Bilirubin   Date Value Ref Range Status   09/17/2020 2 11 (H) 0 20 - 1 00 mg/dL Final   04/16/2019 1 80 (H) 0 20 - 1 00 mg/dL Final   02/06/2018 1 60 (H) 0 20 - 1 00 mg/dL Final     Your bilirubin is always a little elevated  Your other liver tests and your liver ultrasound are normal  We could check a bilirubin level in a non-fasting state to confirm this  Essential hypertension  BP Readings from Last 3 Encounters:   09/17/20 130/72   06/07/19 124/84   04/16/19 142/80   Your blood pressure is better-controlled now that you are on lisinopril  Your potassium and kidney function are normal (we monitor these for patients on lisinopril)  We discussed what an optimal blood pressure should be  There are conflicting recommendations from national guidelines  Your measurement today is acceptable  It does appear that stroke risks are lower with lower blood pressures (even within what are considered normal ranges)  However, as a practical matter many doctors are choosing the target of <140/90  Elevated PSA measurement  Lab Results   Component Value Date    PSA 2 5 09/17/2020    PSA 3 9 04/16/2019    PSA 1 0 02/06/2018    Your PSA has decreased from last year's value  Given you have no voiding symptoms other than getting up once a night to urinate (which is not new), I would recommend following annual PSA  Dysplastic nevus, severe vs  early melanoma left thigh  No evidence of recurrence of the lesion on your left thigh and no significant lesions on exam  Continue annual dermatology follow up and notify your dermatologist of any rapidly growing lesions or any lesions that are particularly dark, multi-colored, or irregularly-shaped      BPH without obstruction/lower urinary tract symptoms  No new symptoms  Whether saw palmetto works for prostate health is unclear  It's up to you whether to keep taking it  Impaired fasting glucose  Glucose, Fasting   Date Value Ref Range Status   09/17/2020 102 (H) 65 - 99 mg/dL Final   04/16/2019 104 (H) 65 - 99 mg/dL Final   02/06/2018 108 (H) 65 - 99 mg/dL Final   Your fasting blood sugar is a little elevated but has dropped compared to your last 2 readings  The best thing to do to lower blood sugar to normal is limited carbohydrates, lose weight, and keep exercising  Hyponatremia  Sodium   Date Value Ref Range Status   09/17/2020 133 (L) 136 - 145 mmol/L Final   04/16/2019 136 136 - 145 mmol/L Final   02/06/2018 140 136 - 145 mmol/L Final   Your sodium is a little low today  This could be related to being in the fasting state and having drunk a lot of water this morning  I would recommend repeating this test in a non-fasting state  Other hyperlipidemia  Lab Results   Component Value Date    CHOLESTEROL 232 (H) 09/17/2020    CHOLESTEROL 176 04/16/2019    CHOLESTEROL 224 (H) 02/06/2018    TRIG 74 09/17/2020    TRIG 96 04/16/2019    TRIG 144 02/06/2018    HDL 68 09/17/2020    HDL 53 04/16/2019    HDL 71 (H) 02/06/2018    LDLCALC 149 (H) 09/17/2020    LDLCALC 104 (H) 04/16/2019    LDLCALC 124 (H) 02/06/2018   Your cholesterol has increased since last year  Your LDL (bad cholesterol) has also jumped  This tends to respond to increasing fiber intake and reducing intake of animal fat and dairy  You have an excellent HDL (good cholesterol) which tends to protect you from heart disease  Afshan Cornejo, it was a pleasure meeting you today  Best of luck with all your health endeavors   See you next year for your 1/2 day exam     Vamshi Cade MD   Cell [phone]  Office [phone]

## 2020-09-17 NOTE — ASSESSMENT & PLAN NOTE
Lab Results   Component Value Date    PSA 2 5 09/17/2020    PSA 3 9 04/16/2019    PSA 1 0 02/06/2018    Your PSA has decreased from last year's value  Given you have no voiding symptoms other than getting up once a night to urinate (which is not new), I would recommend following annual PSA

## 2020-09-21 LAB
CHEST PAIN STATEMENT: NORMAL
MAX DIASTOLIC BP: 78 MMHG
MAX HEART RATE: 153 BPM
MAX PREDICTED HEART RATE: 161 BPM
MAX. SYSTOLIC BP: 184 MMHG
PROTOCOL NAME: NORMAL
REASON FOR TERMINATION: NORMAL
TARGET HR FORMULA: NORMAL
TEST INDICATION: NORMAL
TIME IN EXERCISE PHASE: NORMAL

## 2020-11-30 ENCOUNTER — OFFICE VISIT (OUTPATIENT)
Dept: FAMILY MEDICINE CLINIC | Facility: CLINIC | Age: 60
End: 2020-11-30
Payer: COMMERCIAL

## 2020-11-30 VITALS
TEMPERATURE: 97 F | BODY MASS INDEX: 26.07 KG/M2 | DIASTOLIC BLOOD PRESSURE: 72 MMHG | HEART RATE: 77 BPM | OXYGEN SATURATION: 97 % | WEIGHT: 176 LBS | SYSTOLIC BLOOD PRESSURE: 114 MMHG | HEIGHT: 69 IN

## 2020-11-30 DIAGNOSIS — Z00.00 HEALTH MAINTENANCE EXAMINATION: Primary | ICD-10-CM

## 2020-11-30 DIAGNOSIS — I10 ESSENTIAL HYPERTENSION: ICD-10-CM

## 2020-11-30 DIAGNOSIS — E78.49 OTHER HYPERLIPIDEMIA: ICD-10-CM

## 2020-11-30 DIAGNOSIS — N52.9 ERECTILE DYSFUNCTION, UNSPECIFIED ERECTILE DYSFUNCTION TYPE: ICD-10-CM

## 2020-11-30 PROCEDURE — 99396 PREV VISIT EST AGE 40-64: CPT | Performed by: FAMILY MEDICINE

## 2020-11-30 RX ORDER — SILDENAFIL 50 MG/1
50 TABLET, FILM COATED ORAL DAILY PRN
Qty: 10 TABLET | Refills: 5 | Status: SHIPPED | OUTPATIENT
Start: 2020-11-30 | End: 2021-04-06 | Stop reason: SDUPTHER

## 2020-11-30 RX ORDER — ATORVASTATIN CALCIUM 10 MG/1
10 TABLET, FILM COATED ORAL DAILY
Qty: 90 TABLET | Refills: 3 | Status: SHIPPED | OUTPATIENT
Start: 2020-11-30 | End: 2021-11-19

## 2021-02-08 ENCOUNTER — OFFICE VISIT (OUTPATIENT)
Dept: DERMATOLOGY | Facility: CLINIC | Age: 61
End: 2021-02-08
Payer: COMMERCIAL

## 2021-02-08 VITALS — TEMPERATURE: 97.3 F

## 2021-02-08 DIAGNOSIS — D22.9 NEVUS: ICD-10-CM

## 2021-02-08 DIAGNOSIS — Z13.89 SCREENING FOR SKIN CONDITION: ICD-10-CM

## 2021-02-08 DIAGNOSIS — Z85.820 HISTORY OF MELANOMA: ICD-10-CM

## 2021-02-08 DIAGNOSIS — L57.0 ACTINIC KERATOSIS: Primary | ICD-10-CM

## 2021-02-08 PROCEDURE — 17000 DESTRUCT PREMALG LESION: CPT | Performed by: DERMATOLOGY

## 2021-02-08 PROCEDURE — 1036F TOBACCO NON-USER: CPT | Performed by: DERMATOLOGY

## 2021-02-08 PROCEDURE — 99213 OFFICE O/P EST LOW 20 MIN: CPT | Performed by: DERMATOLOGY

## 2021-02-08 NOTE — PROGRESS NOTES
[facility]  [address] [place] [address]  [place] [address]  [phone]  [phone]     MRN: [id number] : 1960  Encounter: [id number]  Patient Information: Gladys Chamberlain  Chief complaint: 6 month check up    History of present illness:  62 year male presents for overall skin check previous history of melanoma no specific concerns except a spot on his right cheek and also mole on the left flank we had discussed previously  Past Medical History:   Diagnosis Date    Dysplastic nevus, severe vs  early melanoma 2019    Essential hypertension 2018    Hyperlipidemia     Impaired fasting glucose 2020     Past Surgical History:   Procedure Laterality Date    COLONOSCOPY      COLONOSCOPY      Virtual colonoscopy - clear   KNEE ARTHROSCOPY Left     Meniscus   KNEE ARTHROSCOPY Right 2016    Meniscus   [place] COLONOSCOPY FLX DX W/COLLJ SPEC WHEN PFRMD N/A 3/27/2018    Procedure: COLONOSCOPY;  Surgeon: Rojelio Benton MD;  Location: [facility]; Service: Gastroenterology    TONSILLECTOMY  1969     Social History   Social History     Substance and Sexual Activity   Alcohol Use Yes    Alcohol/week: 7 0 - 10 0 standard drinks    Types: 7 - 10 Standard drinks or equivalent per week    Frequency: 4 or more times a week    Drinks per session: 1 or 2     Social History     Substance and Sexual Activity   Drug Use No     Social History     Tobacco Use   Smoking Status Never Smoker   Smokeless Tobacco Never Used     Family History   Problem Relation Age of Onset    Heart disease Father     Hyperlipidemia Father     Hyperlipidemia Brother      Meds/Allergies   Allergies   Allergen Reactions    Penicillins Rash       Meds:  Prior to Admission medications    Medication Sig Start Date End Date Taking?  Authorizing Provider   atorvastatin (LIPITOR) 10 mg tablet Take 1 tablet (10 mg total) by mouth daily 20  Yes Peg Patel MD calcium gluconate 500 mg tablet Take 1 tablet (500 mg total) by mouth 2 (two) times a day for 30 days 2/5/18 2/8/21 Yes Pro Navarro DO   cholecalciferol (VITAMIN D3) 1,000 units tablet Take 1 tablet (1,000 Units total) by mouth daily for 30 days 2/5/18 2/8/21 Yes Pro Navarro DO   lisinopril (ZESTRIL) 10 mg tablet Take 1 tablet (10 mg total) by mouth daily 9/17/20  Yes Sandi Marsh MD   Omega 3 1200 MG CAPS Take 1 capsule (1,200 mg total) by mouth 2 (two) times a day for 30 days 2/5/18 2/8/21 Yes Pro Navarro DO   sildenafil (VIAGRA) 50 MG tablet Take 1 tablet (50 mg total) by mouth daily as needed for erectile dysfunction 11/30/20  Yes Cristofer Mora MD       Subjective:     Review of Systems:    General: negative for - chills, fatigue, fever,  weight gain or weight loss  Psychological: negative for - anxiety, behavioral disorder, concentration difficulties, decreased libido, depression, irritability, memory difficulties, mood swings, sleep disturbances or suicidal ideation  ENT: negative for - hearing difficulties , nasal congestion, nasal discharge, oral lesions, sinus pain, sneezing, sore throat  Allergy and Immunology: negative for - hives, insect bite sensitivity,  Hematological and Lymphatic: negative for - bleeding problems, blood clots,bruising, swollen lymph nodes  Endocrine: negative for - hair pattern changes, hot flashes, malaise/lethargy, mood swings, palpitations, polydipsia/polyuria, skin changes, temperature intolerance or unexpected weight change  Respiratory: negative for - cough, hemoptysis, orthopnea, shortness of breath, or wheezing  Cardiovascular: negative for - chest pain, dyspnea on exertion, edema,  Gastrointestinal: negative for - abdominal pain, nausea/vomiting  Genito-Urinary: negative for - dysuria, incontinence, irregular/heavy menses or urinary frequency/urgency  Musculoskeletal: negative for - gait disturbance, joint pain, joint stiffness, joint swelling, muscle pain, muscular weakness  Dermatological:  As in HPI  Neurological: negative for confusion, dizziness, headaches, impaired coordination/balance, memory loss, numbness/tingling, seizures, speech problems, tremors or weakness       Objective:   Temp (!) 97 3 °F (36 3 °C)     Physical Exam:    General Appearance:    Alert, cooperative, no distress   Head:    Normocephalic, without obvious abnormality, atraumatic           Skin:   A full skin exam was performed including scalp, head scalp, eyes, ears, nose, lips, neck, chest, axilla, abdomen, back, buttocks, bilateral upper extremities, bilateral lower extremities, hands, feet, fingers, toes, fingernails, and toenails scaling erythematous papule noted on the right cheek with normal keratotic papules with greasy stuck on appearance previous site of melanoma well healed without recurrence pigmented lesion noted on the left flank no essential atypia noted  Physical Exam  HENT:      Head:          Cryotherapy Procedure Note    Pre-operative Diagnosis: actinic keratosis    Plan:  1  Instructed to keep the area dry and clean thereafter  Apply petrolatum if area gets crusty  2  Recommended that the patient use acetaminophen  as needed for pain  Locations:  Right cheek    Indications: Destruction of actinic keratosis x1    Patient informed of risks (permanent scarring, infection, light or dark discoloration, bleeding, infection, weakness, numbness and recurrence of the lesion) and benefits of the procedure and verbal informed consent obtained  The areas are treated with liquid nitrogen therapy, frozen until ice ball extended 2 mm beyond lesion, allowed to thaw, and treated again  The patient tolerated procedure well  The patient was instructed on post-op care, warned that there may be blister formation, redness and pain  Recommend OTC analgesia as needed for pain  Condition:  Stable    Complications:  none  Assessment:     1  Actinic keratosis     2  Nevus     3  Screening for skin condition     4  History of melanoma           Plan:   Actinic Keratosis:  Patient advised lesions are precancers  These should resolve with cryosurgery if not to let us know sun block recommended  Nevi reviewed the concept of ABCDE and ugly duckling nothing markedly atypical patient reassured  History of melanoma no recurrence nothing else atypical sunblock recommended follow-up in 6 months  Screening for dermatologic disorders nothing else of concern noted on complete exam follow-up in 6 months    Crispin Hankins MD  2/8/2021,10:23 AM    Portions of the record may have been created with voice recognition software   Occasional wrong word or "sound a like" substitutions may have occurred due to the inherent limitations of voice recognition software   Read the chart carefully and recognize, using context, where substitutions have occurred

## 2021-02-08 NOTE — PATIENT INSTRUCTIONS
Actinic Keratosis:  Patient advised lesions are precancers  These should resolve with cryosurgery if not to let us know sun block recommended    Nevi reviewed the concept of ABCDE and ugly duckling nothing markedly atypical patient reassured  History of melanoma no recurrence nothing else atypical sunblock recommended follow-up in 6 months  Screening for dermatologic disorders nothing else of concern noted on complete exam follow-up in 6 months

## 2021-03-13 DIAGNOSIS — R03.0 BLOOD PRESSURE ELEVATED WITHOUT HISTORY OF HTN: ICD-10-CM

## 2021-03-15 DIAGNOSIS — R03.0 BLOOD PRESSURE ELEVATED WITHOUT HISTORY OF HTN: ICD-10-CM

## 2021-03-15 RX ORDER — LISINOPRIL 10 MG/1
TABLET ORAL
Qty: 90 TABLET | Refills: 1 | OUTPATIENT
Start: 2021-03-15

## 2021-03-15 RX ORDER — LISINOPRIL 10 MG/1
10 TABLET ORAL DAILY
Qty: 90 TABLET | Refills: 1 | Status: SHIPPED | OUTPATIENT
Start: 2021-03-15 | End: 2021-11-19

## 2021-03-15 NOTE — TELEPHONE ENCOUNTER
Cara Nielsen  This came to me since I prescribed it at his [facility] physical     Have a great week    Yolette Gipson

## 2021-04-06 ENCOUNTER — OFFICE VISIT (OUTPATIENT)
Dept: FAMILY MEDICINE CLINIC | Facility: CLINIC | Age: 61
End: 2021-04-06
Payer: COMMERCIAL

## 2021-04-06 VITALS
OXYGEN SATURATION: 98 % | BODY MASS INDEX: 26.75 KG/M2 | HEIGHT: 69 IN | HEART RATE: 74 BPM | SYSTOLIC BLOOD PRESSURE: 110 MMHG | TEMPERATURE: 96.5 F | DIASTOLIC BLOOD PRESSURE: 60 MMHG | WEIGHT: 180.6 LBS

## 2021-04-06 DIAGNOSIS — I10 ESSENTIAL HYPERTENSION: Primary | ICD-10-CM

## 2021-04-06 DIAGNOSIS — E80.4 GILBERT'S SYNDROME: ICD-10-CM

## 2021-04-06 DIAGNOSIS — E78.49 OTHER HYPERLIPIDEMIA: ICD-10-CM

## 2021-04-06 DIAGNOSIS — N52.9 ERECTILE DYSFUNCTION, UNSPECIFIED ERECTILE DYSFUNCTION TYPE: ICD-10-CM

## 2021-04-06 PROCEDURE — 3008F BODY MASS INDEX DOCD: CPT | Performed by: FAMILY MEDICINE

## 2021-04-06 PROCEDURE — 99214 OFFICE O/P EST MOD 30 MIN: CPT | Performed by: FAMILY MEDICINE

## 2021-04-06 PROCEDURE — 3078F DIAST BP <80 MM HG: CPT | Performed by: FAMILY MEDICINE

## 2021-04-06 PROCEDURE — 3074F SYST BP LT 130 MM HG: CPT | Performed by: FAMILY MEDICINE

## 2021-04-06 PROCEDURE — 3725F SCREEN DEPRESSION PERFORMED: CPT | Performed by: FAMILY MEDICINE

## 2021-04-06 PROCEDURE — 1036F TOBACCO NON-USER: CPT | Performed by: FAMILY MEDICINE

## 2021-04-06 RX ORDER — SILDENAFIL 50 MG/1
50 TABLET, FILM COATED ORAL DAILY PRN
Qty: 10 TABLET | Refills: 5 | Status: SHIPPED | OUTPATIENT
Start: 2021-04-06 | End: 2022-04-23

## 2021-04-06 NOTE — PROGRESS NOTES
BMI Counseling: Body mass index is 26 67 kg/m²  The BMI is above normal  Nutrition recommendations include decreasing portion sizes and moderation in carbohydrate intake  Exercise recommendations include exercising 3-5 times per week  No pharmacotherapy was ordered  Assessment/Plan:     return visit in 1 year for annual physical     Problem List Items Addressed This Visit        Cardiovascular and Mediastinum    Essential hypertension - Primary      Continue lisinopril 10 mg daily            Other    Gilbert's syndrome    Other hyperlipidemia       Continue Lipitor 10 mg daily           Other Visit Diagnoses     Erectile dysfunction, unspecified erectile dysfunction type        Relevant Medications    sildenafil (VIAGRA) 50 MG tablet            Subjective:      Patient ID: Jade Can is a 62 y o  male  Patient comes in for checkup  He is taking Lipitor for elevated cholesterol without problems and lisinopril for hypertension also without problems  Complains of erectile dysfunction  The following portions of the patient's history were reviewed and updated as appropriate:   Past Medical History:  He has a past medical history of Dysplastic nevus, severe vs  early melanoma (7/1/2019), Essential hypertension (2/6/2018), Hyperlipidemia, and Impaired fasting glucose (9/16/2020)  ,  _______________________________________________________________________  Medical Problems:  does not have any pertinent problems on file ,  _______________________________________________________________________  Past Surgical History:   has a past surgical history that includes Tonsillectomy (1969); Knee arthroscopy (Left, 2006); Knee arthroscopy (Right, 2016); Colonoscopy (2001); Colonoscopy (2010); and pr colonoscopy flx dx w/collj spec when pfrmd (N/A, 3/27/2018)  ,  _______________________________________________________________________  Family History:  family history includes Heart disease in his father; Hyperlipidemia in his brother and father ,  _______________________________________________________________________  Social History:   reports that he has never smoked  He has never used smokeless tobacco  He reports current alcohol use of about 7 0 - 10 0 standard drinks of alcohol per week  He reports that he does not use drugs  ,  _______________________________________________________________________  Allergies:  is allergic to penicillins     _______________________________________________________________________  Current Outpatient Medications   Medication Sig Dispense Refill    atorvastatin (LIPITOR) 10 mg tablet Take 1 tablet (10 mg total) by mouth daily 90 tablet 3    calcium gluconate 500 mg tablet Take 1 tablet (500 mg total) by mouth 2 (two) times a day for 30 days 60 tablet 11    cholecalciferol (VITAMIN D3) 1,000 units tablet Take 1 tablet (1,000 Units total) by mouth daily for 30 days 30 tablet 11    lisinopril (ZESTRIL) 10 mg tablet Take 1 tablet (10 mg total) by mouth daily 90 tablet 1    Omega 3 1200 MG CAPS Take 1 capsule (1,200 mg total) by mouth 2 (two) times a day for 30 days 60 capsule 11    sildenafil (VIAGRA) 50 MG tablet Take 1 tablet (50 mg total) by mouth daily as needed for erectile dysfunction 10 tablet 5     No current facility-administered medications for this visit       _______________________________________________________________________  Review of Systems   Constitutional: Negative  Respiratory: Negative  Cardiovascular: Negative  Objective:  Vitals:    04/06/21 0902   BP: 110/60   BP Location: Left arm   Patient Position: Sitting   Cuff Size: Adult   Pulse: 74   Temp: (!) 96 5 °F (35 8 °C)   TempSrc: Tympanic   SpO2: 98%   Weight: 81 9 kg (180 lb 9 6 oz)   Height: 5' 9" (1 753 m)     Body mass index is 26 67 kg/m²  Physical Exam  Constitutional:       Appearance: Normal appearance  He is well-developed  HENT:      Head: Normocephalic and atraumatic  Eyes:      Pupils: Pupils are equal, round, and reactive to light  Neck:      Musculoskeletal: Neck supple  Cardiovascular:      Rate and Rhythm: Normal rate and regular rhythm  Heart sounds: Normal heart sounds  Pulmonary:      Effort: Pulmonary effort is normal       Breath sounds: Normal breath sounds  Abdominal:      General: Bowel sounds are normal       Palpations: Abdomen is soft  Musculoskeletal: Normal range of motion  Skin:     General: Skin is warm and dry  Neurological:      Mental Status: He is alert and oriented to person, place, and time  Psychiatric:         Thought Content:  Thought content normal

## 2021-08-19 ENCOUNTER — OFFICE VISIT (OUTPATIENT)
Dept: DERMATOLOGY | Facility: CLINIC | Age: 61
End: 2021-08-19
Payer: COMMERCIAL

## 2021-08-19 DIAGNOSIS — D22.9 NEVUS: Primary | ICD-10-CM

## 2021-08-19 DIAGNOSIS — Z13.89 SCREENING FOR SKIN CONDITION: ICD-10-CM

## 2021-08-19 DIAGNOSIS — Z85.820 HISTORY OF MELANOMA: ICD-10-CM

## 2021-08-19 DIAGNOSIS — L82.1 SEBORRHEIC KERATOSIS: ICD-10-CM

## 2021-08-19 PROCEDURE — 99213 OFFICE O/P EST LOW 20 MIN: CPT | Performed by: DERMATOLOGY

## 2021-08-19 PROCEDURE — 1036F TOBACCO NON-USER: CPT | Performed by: DERMATOLOGY

## 2021-08-19 NOTE — PROGRESS NOTES
[facility] DERMATOLOGY  [address] [place] [id number]  [phone]  [phone]     MRN: [id number] : 1960  Encounter: [id number]  Patient Information: Javier Acevedo  Chief complaint: 6 month check up    History of present illness:  60-year-old male presents for overall skin check previous history atypical nevus question early melanoma removed previously no other concerns noted  Past Medical History:   Diagnosis Date    Dysplastic nevus, severe vs  early melanoma 2019    Essential hypertension 2018    Hyperlipidemia     Impaired fasting glucose 2020     Past Surgical History:   Procedure Laterality Date    COLONOSCOPY      COLONOSCOPY      Virtual colonoscopy - clear   KNEE ARTHROSCOPY Left     Meniscus   KNEE ARTHROSCOPY Right 2016    Meniscus   OH COLONOSCOPY FLX DX W/COLLJ SPEC WHEN PFRMD N/A 3/27/2018    Procedure: COLONOSCOPY;  Surgeon: Janet Montes MD;  Location: [facility]; Service: Gastroenterology    TONSILLECTOMY       Social History   Social History     Substance and Sexual Activity   Alcohol Use Yes    Alcohol/week: 7 0 - 10 0 standard drinks    Types: 7 - 10 Standard drinks or equivalent per week     Social History     Substance and Sexual Activity   Drug Use No     Social History     Tobacco Use   Smoking Status Never Smoker   Smokeless Tobacco Never Used     Family History   Problem Relation Age of Onset    Heart disease Father     Hyperlipidemia Father     Hyperlipidemia Brother      Meds/Allergies   Allergies   Allergen Reactions    Penicillins Rash       Meds:  Prior to Admission medications    Medication Sig Start Date End Date Taking?  Authorizing Provider   atorvastatin (LIPITOR) 10 mg tablet Take 1 tablet (10 mg total) by mouth daily 20  Yes Ike New MD   calcium gluconate 500 mg tablet Take 1 tablet (500 mg total) by mouth 2 (two) times a day for 30 days 18 8/19/21 Yes Pro Navarro DO   cholecalciferol (VITAMIN D3) 1,000 units tablet Take 1 tablet (1,000 Units total) by mouth daily for 30 days 2/5/18 8/19/21 Yes Pro Navarro DO   lisinopril (ZESTRIL) 10 mg tablet Take 1 tablet (10 mg total) by mouth daily 3/15/21  Yes Alfredo Urbina, DO   Omega 3 1200 MG CAPS Take 1 capsule (1,200 mg total) by mouth 2 (two) times a day for 30 days 2/5/18 8/19/21 Yes Pro Navarro DO   sildenafil (VIAGRA) 50 MG tablet Take 1 tablet (50 mg total) by mouth daily as needed for erectile dysfunction 4/6/21  Yes Mary Ellen Nixon MD       Subjective:     Review of Systems:    General: negative for - chills, fatigue, fever,  weight gain or weight loss  Psychological: negative for - anxiety, behavioral disorder, concentration difficulties, decreased libido, depression, irritability, memory difficulties, mood swings, sleep disturbances or suicidal ideation  ENT: negative for - hearing difficulties , nasal congestion, nasal discharge, oral lesions, sinus pain, sneezing, sore throat  Allergy and Immunology: negative for - hives, insect bite sensitivity,  Hematological and Lymphatic: negative for - bleeding problems, blood clots,bruising, swollen lymph nodes  Endocrine: negative for - hair pattern changes, hot flashes, malaise/lethargy, mood swings, palpitations, polydipsia/polyuria, skin changes, temperature intolerance or unexpected weight change  Respiratory: negative for - cough, hemoptysis, orthopnea, shortness of breath, or wheezing  Cardiovascular: negative for - chest pain, dyspnea on exertion, edema,  Gastrointestinal: negative for - abdominal pain, nausea/vomiting  Genito-Urinary: negative for - dysuria, incontinence, irregular/heavy menses or urinary frequency/urgency  Musculoskeletal: negative for - gait disturbance, joint pain, joint stiffness, joint swelling, muscle pain, muscular weakness  Dermatological:  As in HPI  Neurological: negative for confusion, dizziness, headaches, impaired coordination/balance, memory loss, numbness/tingling, seizures, speech problems, tremors or weakness       Objective: There were no vitals taken for this visit  Physical Exam:    General Appearance:    Alert, cooperative, no distress   Head:    Normocephalic, without obvious abnormality, atraumatic           Skin:   A full skin exam was performed including scalp, head scalp, eyes, ears, nose, lips, neck, chest, axilla, abdomen, back, buttocks, bilateral upper extremities, bilateral lower extremities, hands, feet, fingers, toes, fingernails, and toenails normal pigmented lesions regular shape and color normal keratotic papules with greasy stuck appearance also flesh-colored 2 mm papule noted on the left nasal labial fold nothing else remarkable noted on exam     Assessment:     1  Nevus     2  Seborrheic keratosis     3  Screening for skin condition     4  History of melanoma           Plan:   Nevi reviewed the concept of ABCDE and ugly duckling nothing markedly atypical patient reassured  Seborrheic keratosis patient reassured these are normal growths we acquire with age no treatment needed  History of evolving melanoma no recurrence nothing else atypical sunblock recommended follow-up in 6 months  Screening for dermatologic disorders nothing else of concern noted on complete exam follow-up in 6 months    Velvet Foster MD  8/19/2021,8:12 AM    Portions of the record may have been created with voice recognition software   Occasional wrong word or "sound a like" substitutions may have occurred due to the inherent limitations of voice recognition software   Read the chart carefully and recognize, using context, where substitutions have occurred

## 2021-08-19 NOTE — PATIENT INSTRUCTIONS
Nevi reviewed the concept of ABCDE and ugly duckling nothing markedly atypical patient reassured  Seborrheic keratosis patient reassured these are normal growths we acquire with age no treatment needed  History of evolving melanoma no recurrence nothing else atypical sunblock recommended follow-up in 6 months  Screening for dermatologic disorders nothing else of concern noted on complete exam follow-up in 6 months

## 2021-11-09 DIAGNOSIS — Z00.00 ENCOUNTER FOR PREVENTIVE HEALTH EXAMINATION: ICD-10-CM

## 2021-11-19 DIAGNOSIS — E78.49 OTHER HYPERLIPIDEMIA: ICD-10-CM

## 2021-11-19 DIAGNOSIS — R03.0 BLOOD PRESSURE ELEVATED WITHOUT HISTORY OF HTN: ICD-10-CM

## 2021-11-19 RX ORDER — LISINOPRIL 10 MG/1
TABLET ORAL
Qty: 90 TABLET | Refills: 1 | Status: SHIPPED | OUTPATIENT
Start: 2021-11-19 | End: 2022-01-03 | Stop reason: SDUPTHER

## 2021-11-19 RX ORDER — ATORVASTATIN CALCIUM 10 MG/1
TABLET, FILM COATED ORAL
Qty: 90 TABLET | Refills: 3 | Status: SHIPPED | OUTPATIENT
Start: 2021-11-19

## 2021-11-23 ENCOUNTER — APPOINTMENT (OUTPATIENT)
Dept: LAB | Facility: CLINIC | Age: 61
End: 2021-11-23

## 2021-11-23 ENCOUNTER — HOSPITAL ENCOUNTER (OUTPATIENT)
Dept: NON INVASIVE DIAGNOSTICS | Facility: CLINIC | Age: 61
Discharge: HOME | End: 2021-11-23

## 2021-11-23 ENCOUNTER — OFFICE VISIT (OUTPATIENT)
Dept: FAMILY MEDICINE CLINIC | Facility: CLINIC | Age: 61
End: 2021-11-23

## 2021-11-23 VITALS
WEIGHT: 180 LBS | BODY MASS INDEX: 26.66 KG/M2 | DIASTOLIC BLOOD PRESSURE: 82 MMHG | HEART RATE: 64 BPM | SYSTOLIC BLOOD PRESSURE: 132 MMHG | HEIGHT: 69 IN

## 2021-11-23 VITALS
HEART RATE: 64 BPM | SYSTOLIC BLOOD PRESSURE: 132 MMHG | DIASTOLIC BLOOD PRESSURE: 82 MMHG | BODY MASS INDEX: 26.72 KG/M2 | HEIGHT: 69 IN | RESPIRATION RATE: 14 BRPM | WEIGHT: 180.4 LBS

## 2021-11-23 DIAGNOSIS — R73.01 IMPAIRED FASTING GLUCOSE: ICD-10-CM

## 2021-11-23 DIAGNOSIS — E80.4 GILBERT'S SYNDROME: ICD-10-CM

## 2021-11-23 DIAGNOSIS — Z00.00 ENCOUNTER FOR PREVENTIVE HEALTH EXAMINATION: ICD-10-CM

## 2021-11-23 DIAGNOSIS — E55.9 VITAMIN D DEFICIENCY: ICD-10-CM

## 2021-11-23 DIAGNOSIS — N40.0 BPH WITHOUT OBSTRUCTION/LOWER URINARY TRACT SYMPTOMS: ICD-10-CM

## 2021-11-23 DIAGNOSIS — N52.9 ERECTILE DYSFUNCTION, UNSPECIFIED ERECTILE DYSFUNCTION TYPE: ICD-10-CM

## 2021-11-23 DIAGNOSIS — I10 ESSENTIAL HYPERTENSION: ICD-10-CM

## 2021-11-23 DIAGNOSIS — Z00.00 WELL ADULT EXAM: Primary | ICD-10-CM

## 2021-11-23 DIAGNOSIS — D69.6 THROMBOCYTOPENIA (HCC): ICD-10-CM

## 2021-11-23 DIAGNOSIS — E78.2 MIXED HYPERLIPIDEMIA: ICD-10-CM

## 2021-11-23 PROBLEM — R97.20 ELEVATED PSA MEASUREMENT: Status: RESOLVED | Noted: 2019-04-16 | Resolved: 2021-11-23

## 2021-11-23 LAB
25(OH)D3 SERPL-MCNC: 29.6 NG/ML (ref 30–100)
ALBUMIN SERPL BCP-MCNC: 4 G/DL (ref 3.5–5)
ALP SERPL-CCNC: 67 U/L (ref 46–116)
ALT SERPL W P-5'-P-CCNC: 44 U/L (ref 12–78)
ANION GAP SERPL CALCULATED.3IONS-SCNC: 9 MMOL/L (ref 4–13)
AORTIC ROOT: 3.2 CM
APICAL FOUR CHAMBER EJECTION FRACTION: 64 %
AST SERPL W P-5'-P-CCNC: 23 U/L (ref 5–45)
ATRIAL RATE: 69 BPM
BACTERIA UR QL AUTO: NORMAL /HPF
BASELINE ST DEPRESSION: 0 MM
BASOPHILS # BLD AUTO: 0.05 THOUSANDS/ΜL (ref 0–0.1)
BASOPHILS NFR BLD AUTO: 1 % (ref 0–1)
BILIRUB SERPL-MCNC: 1.05 MG/DL (ref 0.2–1)
BILIRUB UR QL STRIP: NEGATIVE
BUN SERPL-MCNC: 16 MG/DL (ref 5–25)
CALCIUM SERPL-MCNC: 8.8 MG/DL (ref 8.3–10.1)
CHLORIDE SERPL-SCNC: 102 MMOL/L (ref 100–108)
CHOLEST SERPL-MCNC: 154 MG/DL
CLARITY UR: NORMAL
CO2 SERPL-SCNC: 25 MMOL/L (ref 21–32)
COLOR UR: COLORLESS
CREAT SERPL-MCNC: 0.95 MG/DL (ref 0.6–1.3)
CRP SERPL HS-MCNC: 0.98 MG/L
E WAVE DECELERATION TIME: 177 MS
EOSINOPHIL # BLD AUTO: 0.12 THOUSAND/ΜL (ref 0–0.61)
EOSINOPHIL NFR BLD AUTO: 2 % (ref 0–6)
ERYTHROCYTE [DISTWIDTH] IN BLOOD BY AUTOMATED COUNT: 11.9 % (ref 11.6–15.1)
EST. AVERAGE GLUCOSE BLD GHB EST-MCNC: 97 MG/DL
FRACTIONAL SHORTENING: 31 % (ref 28–44)
GFR SERPL CREATININE-BSD FRML MDRD: 87 ML/MIN/1.73SQ M
GLUCOSE P FAST SERPL-MCNC: 106 MG/DL (ref 65–99)
GLUCOSE UR STRIP-MCNC: NEGATIVE MG/DL
HBA1C MFR BLD: 5 %
HCT VFR BLD AUTO: 47.6 % (ref 36.5–49.3)
HDLC SERPL-MCNC: 59 MG/DL
HGB BLD-MCNC: 15.8 G/DL (ref 12–17)
HGB UR QL STRIP.AUTO: NEGATIVE
IMM GRANULOCYTES # BLD AUTO: 0.02 THOUSAND/UL (ref 0–0.2)
IMM GRANULOCYTES NFR BLD AUTO: 0 % (ref 0–2)
INTERVENTRICULAR SEPTUM IN DIASTOLE (PARASTERNAL SHORT AXIS VIEW): 0.8 CM
KETONES UR STRIP-MCNC: NEGATIVE MG/DL
LDLC SERPL CALC-MCNC: 79 MG/DL (ref 0–100)
LEFT ATRIUM AREA SYSTOLE SINGLE PLANE A4C: 15.6 CM2
LEFT INTERNAL DIMENSION IN SYSTOLE: 3.1 CM (ref 2.1–4)
LEFT VENTRICULAR INTERNAL DIMENSION IN DIASTOLE: 4.5 CM (ref 4.83–7.19)
LEFT VENTRICULAR POSTERIOR WALL IN END DIASTOLE: 0.8 CM
LEFT VENTRICULAR STROKE VOLUME: 55 ML
LEUKOCYTE ESTERASE UR QL STRIP: NEGATIVE
LYMPHOCYTES # BLD AUTO: 1.2 THOUSANDS/ΜL (ref 0.6–4.47)
LYMPHOCYTES NFR BLD AUTO: 24 % (ref 14–44)
MAX HR: 136 BPM
MCH RBC QN AUTO: 30.8 PG (ref 26.8–34.3)
MCHC RBC AUTO-ENTMCNC: 33.2 G/DL (ref 31.4–37.4)
MCV RBC AUTO: 93 FL (ref 82–98)
MONOCYTES # BLD AUTO: 0.61 THOUSAND/ΜL (ref 0.17–1.22)
MONOCYTES NFR BLD AUTO: 12 % (ref 4–12)
MV E'TISSUE VEL-SEP: 10 CM/S
MV PEAK A VEL: 0.68 M/S
MV PEAK E VEL: 78 CM/S
MV STENOSIS PRESSURE HALF TIME: 0 MS
NEUTROPHILS # BLD AUTO: 3.1 THOUSANDS/ΜL (ref 1.85–7.62)
NEUTS SEG NFR BLD AUTO: 61 % (ref 43–75)
NITRITE UR QL STRIP: NEGATIVE
NON-SQ EPI CELLS URNS QL MICRO: NORMAL /HPF
NRBC BLD AUTO-RTO: 0 /100 WBCS
P AXIS: 61 DEGREES
PH UR STRIP.AUTO: 6 [PH]
PLATELET # BLD AUTO: 137 THOUSANDS/UL (ref 149–390)
PMV BLD AUTO: 12.5 FL (ref 8.9–12.7)
POTASSIUM SERPL-SCNC: 3.9 MMOL/L (ref 3.5–5.3)
PR INTERVAL: 152 MS
PROT SERPL-MCNC: 7.7 G/DL (ref 6.4–8.2)
PROT UR STRIP-MCNC: NEGATIVE MG/DL
PSA SERPL-MCNC: 1.7 NG/ML (ref 0–4)
QRS AXIS: 61 DEGREES
QRSD INTERVAL: 90 MS
QT INTERVAL: 406 MS
QTC INTERVAL: 435 MS
RATE PRESSURE PRODUCT: NORMAL
RBC # BLD AUTO: 5.13 MILLION/UL (ref 3.88–5.62)
RBC #/AREA URNS AUTO: NORMAL /HPF
RIGHT ATRIUM AREA SYSTOLE A4C: 11 CM2
RIGHT VENTRICLE ID DIMENSION: 2.9 CM
SL CV LV EF: 60
SL CV PED ECHO LEFT VENTRICLE DIASTOLIC VOLUME (MOD BIPLANE) 2D: 91 ML
SL CV PED ECHO LEFT VENTRICLE SYSTOLIC VOLUME (MOD BIPLANE) 2D: 37 ML
SL CV STRESS RECOVERY BP: NORMAL MMHG
SL CV STRESS RECOVERY HR: 100 BPM
SODIUM SERPL-SCNC: 136 MMOL/L (ref 136–145)
SP GR UR STRIP.AUTO: <=1.005 (ref 1–1.03)
STRESS BASELINE BP: NORMAL MMHG
STRESS BASELINE HR: 71 BPM
STRESS O2 SAT REST: 98 %
STRESS PEAK HR: 153 BPM
STRESS PERCENT HR: 93 %
STRESS POST ESTIMATED WORKLOAD: 13.4 METS
STRESS POST EXERCISE DUR MIN: 12 MIN
STRESS POST O2 SAT PEAK: 97 %
STRESS POST PEAK BP: 150 MMHG
STRESS ST DEPRESSION: 2 MM
STRESS TARGET HR: 150 BPM
T WAVE AXIS: 45 DEGREES
TRICUSPID VALVE S': 0.8 CM/S
TRIGL SERPL-MCNC: 80 MG/DL
TSH SERPL DL<=0.05 MIU/L-ACNC: 1.47 UIU/ML (ref 0.36–3.74)
UROBILINOGEN UR QL STRIP.AUTO: 0.2 E.U./DL
VENTRICULAR RATE: 69 BPM
WBC # BLD AUTO: 5.1 THOUSAND/UL (ref 4.31–10.16)
WBC #/AREA URNS AUTO: NORMAL /HPF
Z-SCORE OF LEFT VENTRICULAR DIMENSION IN END SYSTOLE: -2.69

## 2021-11-23 PROCEDURE — 80061 LIPID PANEL: CPT

## 2021-11-23 PROCEDURE — 82306 VITAMIN D 25 HYDROXY: CPT

## 2021-11-23 PROCEDURE — 83036 HEMOGLOBIN GLYCOSYLATED A1C: CPT

## 2021-11-23 PROCEDURE — 85025 COMPLETE CBC W/AUTO DIFF WBC: CPT

## 2021-11-23 PROCEDURE — 93010 ELECTROCARDIOGRAM REPORT: CPT | Performed by: INTERNAL MEDICINE

## 2021-11-23 PROCEDURE — 93350 STRESS TTE ONLY: CPT

## 2021-11-23 PROCEDURE — 99499EX: Performed by: FAMILY MEDICINE

## 2021-11-23 PROCEDURE — 80053 COMPREHEN METABOLIC PANEL: CPT

## 2021-11-23 PROCEDURE — 93306 TTE W/DOPPLER COMPLETE: CPT

## 2021-11-23 PROCEDURE — 84443 ASSAY THYROID STIM HORMONE: CPT

## 2021-11-23 PROCEDURE — 93005 ELECTROCARDIOGRAM TRACING: CPT

## 2021-11-23 PROCEDURE — 86141 C-REACTIVE PROTEIN HS: CPT

## 2021-11-23 PROCEDURE — 84153 ASSAY OF PSA TOTAL: CPT

## 2021-11-23 PROCEDURE — 93306 TTE W/DOPPLER COMPLETE: CPT | Performed by: INTERNAL MEDICINE

## 2021-11-23 PROCEDURE — 36415 COLL VENOUS BLD VENIPUNCTURE: CPT

## 2021-11-23 PROCEDURE — 81001 URINALYSIS AUTO W/SCOPE: CPT

## 2021-11-23 PROCEDURE — 93351 STRESS TTE COMPLETE: CPT | Performed by: INTERNAL MEDICINE

## 2021-11-26 LAB
CHEST PAIN STATEMENT: NORMAL
MAX DIASTOLIC BP: 78 MMHG
MAX HEART RATE: 150 BPM
MAX PREDICTED HEART RATE: 160 BPM
MAX. SYSTOLIC BP: 162 MMHG
PROTOCOL NAME: NORMAL
REASON FOR TERMINATION: NORMAL
TARGET HR FORMULA: NORMAL
TEST INDICATION: NORMAL
TIME IN EXERCISE PHASE: NORMAL

## 2022-01-03 DIAGNOSIS — R03.0 BLOOD PRESSURE ELEVATED WITHOUT HISTORY OF HTN: ICD-10-CM

## 2022-01-03 RX ORDER — LISINOPRIL 10 MG/1
10 TABLET ORAL DAILY
Qty: 90 TABLET | Refills: 1 | Status: SHIPPED | OUTPATIENT
Start: 2022-01-03 | End: 2022-06-30 | Stop reason: SDUPTHER

## 2022-04-15 ENCOUNTER — TELEPHONE (OUTPATIENT)
Dept: GASTROENTEROLOGY | Facility: CLINIC | Age: 62
End: 2022-04-15

## 2022-04-15 ENCOUNTER — PATIENT MESSAGE (OUTPATIENT)
Dept: GASTROENTEROLOGY | Facility: CLINIC | Age: 62
End: 2022-04-15

## 2022-04-15 NOTE — TELEPHONE ENCOUNTER
----- Message from Olaf Harrison sent at 4/15/2022  2:06 PM EDT -----  Regarding: Colonoscopy  When am I due for my next colonoscopy?

## 2022-06-28 ENCOUNTER — OFFICE VISIT (OUTPATIENT)
Dept: DERMATOLOGY | Facility: CLINIC | Age: 62
End: 2022-06-28
Payer: COMMERCIAL

## 2022-06-28 VITALS — WEIGHT: 180 LBS | BODY MASS INDEX: 26.66 KG/M2 | HEIGHT: 69 IN

## 2022-06-28 DIAGNOSIS — D22.9 NEVUS: ICD-10-CM

## 2022-06-28 DIAGNOSIS — L82.1 SEBORRHEIC KERATOSIS: ICD-10-CM

## 2022-06-28 DIAGNOSIS — Z85.820 HISTORY OF MELANOMA: ICD-10-CM

## 2022-06-28 DIAGNOSIS — Z13.89 SCREENING FOR SKIN CONDITION: ICD-10-CM

## 2022-06-28 DIAGNOSIS — L82.0 INFLAMED SEBORRHEIC KERATOSIS: Primary | ICD-10-CM

## 2022-06-28 PROCEDURE — 17110 DESTRUCTION B9 LES UP TO 14: CPT | Performed by: DERMATOLOGY

## 2022-06-28 PROCEDURE — 99213 OFFICE O/P EST LOW 20 MIN: CPT | Performed by: DERMATOLOGY

## 2022-06-28 NOTE — PATIENT INSTRUCTIONS
Inflamed keratosis lesion treated because the patient concern irritation  Nevi reviewed the concept of ABCDE and ugly duckling nothing markedly atypical patient reassured  Seborrheic keratosis patient reassured these are normal growths we acquire with age no treatment needed  History of melanoma no recurrence nothing else atypical sunblock recommended follow-up in 1 year  Screening for dermatologic disorders nothing else of concern noted on complete exam follow-up in 1 year

## 2022-06-28 NOTE — PROGRESS NOTES
[address]  [facility]  [place] [address]  [phone]  [phone]     MRN: [id number] : 1960  Encounter: [id number]  Patient Information: Viviana Bazan  Chief complaint: yearly check up    History of present illness:  61-year-old male presents for overall skin check with previous history of severe dysplastic no specific concerns except for growth on his right bridge of the nose that appears to be getting larger  Nothing else of concern noted  Past Medical History:   Diagnosis Date    Dysplastic nevus, severe vs  early melanoma 2019    Essential hypertension 2018    Hyperlipidemia     Impaired fasting glucose 2020     Past Surgical History:   Procedure Laterality Date    COLONOSCOPY      COLONOSCOPY      Virtual colonoscopy - clear   KNEE ARTHROSCOPY Left     Meniscus   KNEE ARTHROSCOPY Right 2016    Meniscus   CT COLONOSCOPY FLX DX W/COLLJ SPEC WHEN PFRMD N/A 3/27/2018    Procedure: COLONOSCOPY;  Surgeon: Antonia Bruno MD;  Location: [facility]; Service: Gastroenterology    TONSILLECTOMY  1969     Social History   Social History     Substance and Sexual Activity   Alcohol Use Yes    Alcohol/week: 7 0 - 10 0 standard drinks    Types: 7 - 10 Standard drinks or equivalent per week     Social History     Substance and Sexual Activity   Drug Use No     Social History     Tobacco Use   Smoking Status Never Smoker   Smokeless Tobacco Never Used     Family History   Problem Relation Age of Onset    Heart disease Father     Hyperlipidemia Father     Hyperlipidemia Brother      Meds/Allergies   Allergies   Allergen Reactions    Penicillins Rash       Meds:  Prior to Admission medications    Medication Sig Start Date End Date Taking?  Authorizing Provider   atorvastatin (LIPITOR) 10 mg tablet TAKE 1 TABLET BY MOUTH EVERY DAY 21  Yes Brenda Torres MD   calcium gluconate 500 mg tablet Take 1 tablet (500 mg total) by mouth 2 (two) times a day for 30 days 2/5/18 6/28/22 Yes Pro Navarro DO   cholecalciferol (VITAMIN D3) 1,000 units tablet Take 1 tablet (1,000 Units total) by mouth daily for 30 days 2/5/18 6/28/22 Yes Pro Navarro DO   lisinopril (ZESTRIL) 10 mg tablet Take 1 tablet (10 mg total) by mouth daily 1/3/22  Yes Phuong Donnelly MD   Omega 3 1200 MG CAPS Take 1 capsule (1,200 mg total) by mouth 2 (two) times a day for 30 days 2/5/18 6/28/22 Yes Pro Navarro DO   sildenafil (VIAGRA) 50 MG tablet take 1 tablet by mouth once daily if needed for ERECTILE DYSFUNCTION 4/23/22  Yes Phuong Donnelly MD       Subjective:     Review of Systems:    General: negative for - chills, fatigue, fever,  weight gain or weight loss  Psychological: negative for - anxiety, behavioral disorder, concentration difficulties, decreased libido, depression, irritability, memory difficulties, mood swings, sleep disturbances or suicidal ideation  ENT: negative for - hearing difficulties , nasal congestion, nasal discharge, oral lesions, sinus pain, sneezing, sore throat  Allergy and Immunology: negative for - hives, insect bite sensitivity,  Hematological and Lymphatic: negative for - bleeding problems, blood clots,bruising, swollen lymph nodes  Endocrine: negative for - hair pattern changes, hot flashes, malaise/lethargy, mood swings, palpitations, polydipsia/polyuria, skin changes, temperature intolerance or unexpected weight change  Respiratory: negative for - cough, hemoptysis, orthopnea, shortness of breath, or wheezing  Cardiovascular: negative for - chest pain, dyspnea on exertion, edema,  Gastrointestinal: negative for - abdominal pain, nausea/vomiting  Genito-Urinary: negative for - dysuria, incontinence, irregular/heavy menses or urinary frequency/urgency  Musculoskeletal: negative for - gait disturbance, joint pain, joint stiffness, joint swelling, muscle pain, muscular weakness  Dermatological:  As in HPI  Neurological: negative for confusion, dizziness, headaches, impaired coordination/balance, memory loss, numbness/tingling, seizures, speech problems, tremors or weakness       Objective:   Ht 5' 9" (1 753 m)   Wt 81 6 kg (180 lb)   BMI 26 58 kg/m²     Physical Exam:    General Appearance:    Alert, cooperative, no distress   Head:    Normocephalic, without obvious abnormality, atraumatic           Skin:   A full skin exam was performed including scalp, head scalp, eyes, ears, nose, lips, neck, chest, axilla, abdomen, back, buttocks, bilateral upper extremities, bilateral lower extremities, hands, feet, fingers, toes, fingernails, and toenails 2 mm keratotic papules greasy stuck on appearance noted on the right nasal bridge normal pigmented lesion regular shape color nothing else remarkable noted on exam normal keratotic papules greasy stuck on appearance  Cryotherapy Procedure Note    Pre-operative Diagnosis: inflamed seborrheic keratosis    Plan:  1  Instructed to keep the area dry and clean thereafter  Apply petrolatum if area gets crusty  2  Recommended that the patient use acetaminophen  as needed for pain  Locations:  Right nasal bridge    Indications: Destruction of irritated keratosis x1    Patient informed of risks (permanent scarring, infection, light or dark discoloration, bleeding, infection, weakness, numbness and recurrence of the lesion) and benefits of the procedure and verbal informed consent obtained  The areas are treated with liquid nitrogen therapy, frozen until ice ball extended 2 mm beyond lesion, allowed to thaw, and treated again  The patient tolerated procedure well  The patient was instructed on post-op care, warned that there may be blister formation, redness and pain  Recommend OTC analgesia as needed for pain  Condition:  Stable    Complications:  none  Assessment:     1  Inflamed seborrheic keratosis     2  Nevus     3  Seborrheic keratosis     4   Screening for skin condition     5  History of melanoma           Plan:   Inflamed keratosis lesion treated because the patient concern irritation  Nevi reviewed the concept of ABCDE and ugly duckling nothing markedly atypical patient reassured  Seborrheic keratosis patient reassured these are normal growths we acquire with age no treatment needed  History of melanoma no recurrence nothing else atypical sunblock recommended follow-up in 1 year  Screening for dermatologic disorders nothing else of concern noted on complete exam follow-up in 1 year      Mary Morocho MD  6/28/2022,8:34 AM    Portions of the record may have been created with voice recognition software   Occasional wrong word or "sound a like" substitutions may have occurred due to the inherent limitations of voice recognition software   Read the chart carefully and recognize, using context, where substitutions have occurred

## 2022-06-30 DIAGNOSIS — R03.0 BLOOD PRESSURE ELEVATED WITHOUT HISTORY OF HTN: ICD-10-CM

## 2022-06-30 RX ORDER — LISINOPRIL 10 MG/1
10 TABLET ORAL DAILY
Qty: 90 TABLET | Refills: 1 | Status: SHIPPED | OUTPATIENT
Start: 2022-06-30

## 2022-06-30 RX ORDER — LISINOPRIL 10 MG/1
TABLET ORAL
Qty: 90 TABLET | Refills: 1 | OUTPATIENT
Start: 2022-06-30

## 2022-06-30 NOTE — TELEPHONE ENCOUNTER
Spoke with pt -- reports he had a full PE done with one of the [facility] in November  Would like to know if this would be usable for his refill, or if he needs an appt specifically with Dr Urmila Farias  Please advise

## 2022-10-12 PROBLEM — Z00.00 WELL ADULT EXAM: Status: RESOLVED | Noted: 2020-11-30 | Resolved: 2022-10-12

## 2022-11-10 DIAGNOSIS — N52.9 ERECTILE DYSFUNCTION, UNSPECIFIED ERECTILE DYSFUNCTION TYPE: ICD-10-CM

## 2022-11-10 RX ORDER — SILDENAFIL 50 MG/1
50 TABLET, FILM COATED ORAL AS NEEDED
Qty: 10 TABLET | Refills: 0 | Status: SHIPPED | OUTPATIENT
Start: 2022-11-10

## 2022-11-17 DIAGNOSIS — Z00.00 ENCOUNTER FOR PREVENTIVE HEALTH EXAMINATION: ICD-10-CM

## 2022-12-06 DIAGNOSIS — E78.49 OTHER HYPERLIPIDEMIA: ICD-10-CM

## 2022-12-06 RX ORDER — ATORVASTATIN CALCIUM 10 MG/1
10 TABLET, FILM COATED ORAL DAILY
Qty: 90 TABLET | Refills: 3 | Status: SHIPPED | OUTPATIENT
Start: 2022-12-06

## 2022-12-15 ENCOUNTER — HOSPITAL ENCOUNTER (OUTPATIENT)
Dept: ULTRASOUND IMAGING | Facility: HOSPITAL | Age: 62
Discharge: HOME | End: 2022-12-15

## 2022-12-15 ENCOUNTER — HOSPITAL ENCOUNTER (OUTPATIENT)
Dept: VASCULAR ULTRASOUND | Facility: HOSPITAL | Age: 62
Discharge: HOME | End: 2022-12-15

## 2022-12-15 ENCOUNTER — OFFICE VISIT (OUTPATIENT)
Dept: FAMILY MEDICINE CLINIC | Facility: CLINIC | Age: 62
End: 2022-12-15

## 2022-12-15 ENCOUNTER — HOSPITAL ENCOUNTER (OUTPATIENT)
Dept: NON INVASIVE DIAGNOSTICS | Facility: CLINIC | Age: 62
Discharge: HOME | End: 2022-12-15

## 2022-12-15 ENCOUNTER — APPOINTMENT (OUTPATIENT)
Dept: LAB | Facility: CLINIC | Age: 62
End: 2022-12-15

## 2022-12-15 VITALS
SYSTOLIC BLOOD PRESSURE: 150 MMHG | HEART RATE: 68 BPM | BODY MASS INDEX: 27.1 KG/M2 | HEIGHT: 69 IN | WEIGHT: 182.98 LBS | DIASTOLIC BLOOD PRESSURE: 82 MMHG

## 2022-12-15 VITALS
HEIGHT: 69 IN | WEIGHT: 183 LBS | HEART RATE: 72 BPM | DIASTOLIC BLOOD PRESSURE: 82 MMHG | SYSTOLIC BLOOD PRESSURE: 150 MMHG | BODY MASS INDEX: 27.11 KG/M2 | TEMPERATURE: 97.7 F | OXYGEN SATURATION: 100 % | RESPIRATION RATE: 14 BRPM

## 2022-12-15 DIAGNOSIS — Z00.00 ENCOUNTER FOR PREVENTIVE HEALTH EXAMINATION: ICD-10-CM

## 2022-12-15 DIAGNOSIS — I10 ESSENTIAL HYPERTENSION: Primary | ICD-10-CM

## 2022-12-15 DIAGNOSIS — Z00.00 WELL ADULT EXAM: Primary | ICD-10-CM

## 2022-12-15 PROBLEM — D69.6 THROMBOCYTOPENIA (HCC): Status: RESOLVED | Noted: 2021-11-23 | Resolved: 2022-12-15

## 2022-12-15 PROBLEM — R73.01 IMPAIRED FASTING GLUCOSE: Status: RESOLVED | Noted: 2020-09-16 | Resolved: 2022-12-15

## 2022-12-15 PROBLEM — D23.9 DYSPLASTIC NEVUS: Status: RESOLVED | Noted: 2019-07-01 | Resolved: 2022-12-15

## 2022-12-15 LAB
25(OH)D3 SERPL-MCNC: 22.9 NG/ML (ref 30–100)
ALBUMIN SERPL BCP-MCNC: 4.5 G/DL (ref 3.5–5)
ALP SERPL-CCNC: 62 U/L (ref 34–104)
ALT SERPL W P-5'-P-CCNC: 39 U/L (ref 7–52)
ANION GAP SERPL CALCULATED.3IONS-SCNC: 7 MMOL/L (ref 4–13)
AORTIC ROOT: 3.4 CM
APICAL FOUR CHAMBER EJECTION FRACTION: 63 %
ASCENDING AORTA: 3.4 CM
AST SERPL W P-5'-P-CCNC: 24 U/L (ref 13–39)
ATRIAL RATE: 72 BPM
BACTERIA UR QL AUTO: NORMAL /HPF
BASOPHILS # BLD AUTO: 0.06 THOUSANDS/ÂΜL (ref 0–0.1)
BASOPHILS NFR BLD AUTO: 1 % (ref 0–1)
BILIRUB SERPL-MCNC: 1.79 MG/DL (ref 0.2–1)
BILIRUB UR QL STRIP: NEGATIVE
BUN SERPL-MCNC: 14 MG/DL (ref 5–25)
CALCIUM SERPL-MCNC: 9.4 MG/DL (ref 8.4–10.2)
CHLORIDE SERPL-SCNC: 103 MMOL/L (ref 96–108)
CHOLEST SERPL-MCNC: 156 MG/DL
CLARITY UR: CLEAR
CO2 SERPL-SCNC: 24 MMOL/L (ref 21–32)
COLOR UR: COLORLESS
CREAT SERPL-MCNC: 0.87 MG/DL (ref 0.6–1.3)
CRP SERPL HS-MCNC: <0.9 MG/L
E WAVE DECELERATION TIME: 195 MS
EOSINOPHIL # BLD AUTO: 0.1 THOUSAND/ÂΜL (ref 0–0.61)
EOSINOPHIL NFR BLD AUTO: 2 % (ref 0–6)
ERYTHROCYTE [DISTWIDTH] IN BLOOD BY AUTOMATED COUNT: 11.9 % (ref 11.6–15.1)
EST. AVERAGE GLUCOSE BLD GHB EST-MCNC: 97 MG/DL
FRACTIONAL SHORTENING: 33 % (ref 28–44)
GFR SERPL CREATININE-BSD FRML MDRD: 93 ML/MIN/1.73SQ M
GLUCOSE P FAST SERPL-MCNC: 100 MG/DL (ref 65–99)
GLUCOSE UR STRIP-MCNC: NEGATIVE MG/DL
HBA1C MFR BLD: 5 %
HCT VFR BLD AUTO: 47.9 % (ref 36.5–49.3)
HDLC SERPL-MCNC: 52 MG/DL
HGB BLD-MCNC: 16.7 G/DL (ref 12–17)
HGB UR QL STRIP.AUTO: NEGATIVE
IMM GRANULOCYTES # BLD AUTO: 0.01 THOUSAND/UL (ref 0–0.2)
IMM GRANULOCYTES NFR BLD AUTO: 0 % (ref 0–2)
INTERVENTRICULAR SEPTUM IN DIASTOLE (PARASTERNAL SHORT AXIS VIEW): 0.9 CM
INTERVENTRICULAR SEPTUM: 0.9 CM (ref 0.6–1.1)
KETONES UR STRIP-MCNC: NEGATIVE MG/DL
LAAS-AP2: 15.8 CM2
LAAS-AP4: 14.9 CM2
LDLC SERPL CALC-MCNC: 85 MG/DL (ref 0–100)
LEFT ATRIUM AREA SYSTOLE SINGLE PLANE A4C: 15.7 CM2
LEFT ATRIUM SIZE: 3.6 CM
LEFT INTERNAL DIMENSION IN SYSTOLE: 2.6 CM (ref 2.1–4)
LEFT VENTRICULAR INTERNAL DIMENSION IN DIASTOLE: 3.9 CM (ref 3.5–6)
LEFT VENTRICULAR POSTERIOR WALL IN END DIASTOLE: 0.9 CM
LEFT VENTRICULAR STROKE VOLUME: 42 ML
LEUKOCYTE ESTERASE UR QL STRIP: NEGATIVE
LVSV (TEICH): 42 ML
LYMPHOCYTES # BLD AUTO: 1.2 THOUSANDS/ÂΜL (ref 0.6–4.47)
LYMPHOCYTES NFR BLD AUTO: 23 % (ref 14–44)
MAX HR PERCENT: 94 %
MAX HR: 151 BPM
MCH RBC QN AUTO: 31 PG (ref 26.8–34.3)
MCHC RBC AUTO-ENTMCNC: 34.9 G/DL (ref 31.4–37.4)
MCV RBC AUTO: 89 FL (ref 82–98)
MONOCYTES # BLD AUTO: 0.59 THOUSAND/ÂΜL (ref 0.17–1.22)
MONOCYTES NFR BLD AUTO: 11 % (ref 4–12)
MV E'TISSUE VEL-SEP: 9 CM/S
MV PEAK A VEL: 0.71 M/S
MV PEAK E VEL: 61 CM/S
MV STENOSIS PRESSURE HALF TIME: 57 MS
MV VALVE AREA P 1/2 METHOD: 3.86 CM2
NEUTROPHILS # BLD AUTO: 3.3 THOUSANDS/ÂΜL (ref 1.85–7.62)
NEUTS SEG NFR BLD AUTO: 63 % (ref 43–75)
NITRITE UR QL STRIP: NEGATIVE
NON-SQ EPI CELLS URNS QL MICRO: NORMAL /HPF
NRBC BLD AUTO-RTO: 0 /100 WBCS
P AXIS: 63 DEGREES
PH UR STRIP.AUTO: 5.5 [PH]
PLATELET # BLD AUTO: 145 THOUSANDS/UL (ref 149–390)
PMV BLD AUTO: 12.5 FL (ref 8.9–12.7)
POTASSIUM SERPL-SCNC: 4.1 MMOL/L (ref 3.5–5.3)
PR INTERVAL: 156 MS
PROT SERPL-MCNC: 7.5 G/DL (ref 6.4–8.4)
PROT UR STRIP-MCNC: NEGATIVE MG/DL
PSA SERPL-MCNC: 1.6 NG/ML (ref 0–4)
QRS AXIS: 59 DEGREES
QRSD INTERVAL: 86 MS
QT INTERVAL: 382 MS
QTC INTERVAL: 418 MS
RATE PRESSURE PRODUCT: NORMAL
RBC # BLD AUTO: 5.38 MILLION/UL (ref 3.88–5.62)
RBC #/AREA URNS AUTO: NORMAL /HPF
RIGHT ATRIUM AREA SYSTOLE A4C: 15 CM2
RIGHT VENTRICLE ID DIMENSION: 3.5 CM
SL CV LEFT ATRIUM LENGTH A2C: 5 CM
SL CV LV EF: 65
SL CV PED ECHO LEFT VENTRICLE DIASTOLIC VOLUME (MOD BIPLANE) 2D: 67 ML
SL CV PED ECHO LEFT VENTRICLE SYSTOLIC VOLUME (MOD BIPLANE) 2D: 25 ML
SL CV STRESS RECOVERY BP: NORMAL MMHG
SL CV STRESS RECOVERY HR: 101 BPM
SL CV STRESS STAGE REACHED: 4
SODIUM SERPL-SCNC: 134 MMOL/L (ref 135–147)
SP GR UR STRIP.AUTO: 1 (ref 1–1.03)
STRESS ANGINA INDEX: 0
STRESS BASELINE BP: NORMAL MMHG
STRESS BASELINE HR: 71 BPM
STRESS O2 SAT REST: 99 %
STRESS PEAK HR: 151 BPM
STRESS POST ESTIMATED WORKLOAD: 11.7 METS
STRESS POST EXERCISE DUR MIN: 10 MIN
STRESS POST O2 SAT PEAK: 97 %
STRESS POST PEAK BP: 192 MMHG
T WAVE AXIS: 52 DEGREES
TRIGL SERPL-MCNC: 95 MG/DL
TSH SERPL DL<=0.05 MIU/L-ACNC: 1.64 UIU/ML (ref 0.45–4.5)
UROBILINOGEN UR STRIP-ACNC: <2 MG/DL
VENTRICULAR RATE: 72 BPM
WBC # BLD AUTO: 5.26 THOUSAND/UL (ref 4.31–10.16)
WBC #/AREA URNS AUTO: NORMAL /HPF

## 2022-12-15 RX ORDER — LISINOPRIL 20 MG/1
20 TABLET ORAL DAILY
Qty: 30 TABLET | Refills: 2 | Status: SHIPPED | OUTPATIENT
Start: 2022-12-15 | End: 2023-03-22

## 2022-12-15 NOTE — PROGRESS NOTES
Nutritional Summary and Recommendations:     Patient Nutrition-Oriented Medical/Diet Hx  Karin Kitchen presents today to [facility] for nutrition assessment and counseling  Karin Kitchen feels nutrition has declined since previous [facility] visits  Per diet recall, Karin Kitchen doesn't always eat a full breakfast and snacks during the day  Discussion focused on eating a well-balanced breakfast, healthy snacking, and planning/preparing meals/snacks in advance  Labs reviewed  Nutrition Related Medications/Supplements:  Lipitor  Vitamin D3 1000 IU  Lisinopril  Omega 3     Labs:  A1C 5 0  Total Cholesterol: 156  Triglycerides: 95  HDL: 52  LDL: 85  Vitamin D 22 9    Anthropometrics:     Ht: 5 ft 9 in  Wt: 180 6 lb   BMI: 26 7      BMR: 1661 kcals  Fat%:  18 5 %  Target BF% is 15%     Fat Mass: 33 4 lb FFM: 147 2 lb  TBW: 107 8 lb     Nutrition Diagnosis:     Altered nutrition related laboratory values  r/t physiological issues as evidenced by Vitamin D 22 9       Nutrition Interventions:  Plan well-balanced breakfast meals to include a protein and carbohydrates such as oatmeal with fruit, peanutbutter, or nuts or fruit and low-fat cheese stick or egg  Read food labels for appropriate portion sizes of snack foods, consider pre-portioned packages  When grocery shopping, plan for healthful options to have on hand during the week  Nutrition Recommendations:    1  Improve serum vitamin D levels by supplementing with Vitamin D3 2000 IU daily to increase Vit D levels > 30 within 6-12 months  2  Eat a well-balanced breakfast daily to include a protein and complex carbohydrate  3  Improve body compostion by next visit  4  Focus on nutritious snacking with appropriate portion sizes  5  Contact RD with any questions or concerns      Nutrition Education     Importance of Vitamin D and Healthy Snacking       Perceived Comprehension:   Good    Expected Compliance: Good

## 2022-12-15 NOTE — PROGRESS NOTES
Hearing Assessment Summary:    Hearing Screening    250Hz 500Hz 1000Hz 2000Hz 3000Hz 4000Hz 6000Hz 8000Hz   Right ear 30 20 20 35 15 20 20 20   Left ear 20 13 25 35 30 40 45 40   Comments: HEARING EVALUATION    Name:  Yuval Fofana  :  1960  Age:  65 y o  Date of Evaluation: 12/15/22     History: [employer] Exam  Reason for visit: Yuval Fofana is being seen today for an evaluation of hearing as part of an [facility] examination  Patient reports no changes since his last visit  EVALUATION:    Otoscopic Evaluation:   Right Ear: Moderate cerumen, Could not visualize tympanic membrane   Left Ear: Mild cerumen, Could visualize tympanic membrane    Tympanometry:   Right: Type A - normal middle ear pressure and compliance   Left: Type A - normal middle ear pressure and compliance    Audiogram Results:  Pure tone testing revealed a normal sloping to mild sensorineural hearing loss rising to normal hearing in the  right ear and a normal sloping to moderate sensorineural hearing loss in the  left  ear  SRT and PTA are in agreement indicating good test reliability  Word recognition scores were excellent bilaterally  Hearing is stable since last evaluation  *see attached audiogram      RECOMMENDATIONS:  Annual hearing eval, Consult ENT, Return to [facility]  for F/U, and Copy to Patient/Caregiver    PATIENT EDUCATION:   Discussed results and recommendations with Prince Parks  Questions were addressed and the patient was encouraged to contact our department should concerns arise  Nguyễn Collado    Clinical Audiologist'

## 2022-12-15 NOTE — PROGRESS NOTES
[facility] Physical Exam     Vedia Kawasaki is a 65 y o  male who is presenting for an [facility] Physical Exam at [address]  Rebeka Manrique underwent an [facility] physical on 11/23/21  His Primary Care Physician is Dr Nieves Duboncristina Manrique' goals for health include losing approximately 5 pounds  Rebeka Manrique' active medical conditions include hypertension, hyperlipidemia, vitamin D deficiency and benign prostatic hypertrophy  Benign laboratory phenomena include Gilbert's Syndrome  Rebeka Manrique rates his health status as good  Past medical history:    Dysplastic nevus    Past surgical history:    Colonoscopies in 2001, 2010 and 2018    Left and right knee arthroscopies    Family history:    Coronary artery disease and hyperlipidemia in his father  Hyperlipidemia and hypertension in his brothers    Social history:  Rebeka Manrique is  and currently engaged  He has a son age 34 and a daughter age 21 who are healthy  He works as a   His daughter-in-law just gave birth to twins and Rebeka Manrique is looking forward to spending the Christmas holidays with his son, daughter-in-law and his new grandchildren  Alcohol consumption is 1-2 drinks per day  There is no history of smoking  There is no caffeine intake  Allergies:    Penicillin    Medications:    Atorvastatin 10 mg at bedtime  Lisinopril 10 mg daily  Sildenafil 50 mg daily as needed  Calcium gluconate 600 mg twice daily  Vitamin D3 1000 units daily  Vitamin D 400 units/day      Immunizations:    COVID vaccination: 3 previous vaccinations, last occurring December 29, 2021    Influenza: Vaccinated October 26, 2022        Review of Systems   Constitutional: Negative  HENT: Negative  Eyes: Negative  Respiratory: Negative  Cardiovascular: Negative  Gastrointestinal: Negative  Occasional heartburn   Endocrine: Negative  Genitourinary: Negative      Musculoskeletal: Negative  Skin: Negative  Allergic/Immunologic: Negative  Neurological: Negative  Hematological: Negative  Psychiatric/Behavioral: Negative  Active Ambulatory Problems     Diagnosis Date Noted   • Essential hypertension 02/06/2018   • BPH without obstruction/lower urinary tract symptoms 02/06/2018   • Impaired fasting glucose 09/16/2020   • Dysplastic nevus, severe vs  early melanoma left thigh 07/2019   • Gilbert's syndrome 09/17/2020   • Mixed hyperlipidemia 09/17/2020   • Vitamin D deficiency 11/23/2021   • Erectile dysfunction 11/23/2021   • Thrombocytopenia ([address] ) 11/23/2021     Resolved Ambulatory Problems     Diagnosis Date Noted   • Bilateral impacted cerumen 02/06/2018   • Elevated PSA measurement 04/16/2019   • Well adult exam 11/30/2020     Past Medical History:   Diagnosis Date   • Hyperlipidemia        Past Surgical History:   Procedure Laterality Date   • COLONOSCOPY  2001   • COLONOSCOPY  2010    Virtual colonoscopy - clear  • KNEE ARTHROSCOPY Left 2006    Meniscus  • KNEE ARTHROSCOPY Right 2016    Meniscus  • SD COLONOSCOPY FLX DX W/COLLJ SPEC WHEN PFRMD N/A 3/27/2018    Procedure: COLONOSCOPY;  Surgeon: Ivonne Saini MD;  Location: [facility];   Service: Gastroenterology   • TONSILLECTOMY  1969       Family History   Problem Relation Age of Onset   • Heart disease Father    • Hyperlipidemia Father    • Hyperlipidemia Brother        Social History     Tobacco Use   Smoking Status Never   Smokeless Tobacco Never         Current Outpatient Medications:   •  atorvastatin (LIPITOR) 10 mg tablet, Take 1 tablet (10 mg total) by mouth daily, Disp: 90 tablet, Rfl: 3  •  calcium gluconate 500 mg tablet, Take 1 tablet (500 mg total) by mouth 2 (two) times a day for 30 days, Disp: 60 tablet, Rfl: 11  •  cholecalciferol (VITAMIN D3) 1,000 units tablet, Take 1 tablet (1,000 Units total) by mouth daily for 30 days, Disp: 30 tablet, Rfl: 11  •  lisinopril (ZESTRIL) 10 mg tablet, Take 1 tablet (10 mg total) by mouth daily, Disp: 90 tablet, Rfl: 1  •  Omega 3 1200 MG CAPS, Take 1 capsule (1,200 mg total) by mouth 2 (two) times a day for 30 days, Disp: 60 capsule, Rfl: 11  •  sildenafil (VIAGRA) 50 MG tablet, Take 1 tablet (50 mg total) by mouth as needed for erectile dysfunction, Disp: 10 tablet, Rfl: 0    Allergies   Allergen Reactions   • Penicillins Rash         Objective:       Physical Exam      BP: 150/82   P: 72 RR: 14  T: 97 7 tympanic  Ht:5'9"  Wt: 183 0 lbs    VA: With glasses OD: 20/20 -1    OS: 20/20    Pleasant, alert and oriented and in no acute distress  Affect: normal  HEENT:  NCAT  No temporal artery or temporalis artery tenderness  No adenopathy  Scalp normal  Eyes:  Pupils 3 mm with normal direct and consensual light responses, extraocular eye movements intact  There is no conjunctival pallor or scleral icterus  Ears:  No deformity of external ears  No pain on manipulation of the tragus  No mastoid redness, swelling, tenderness or drainage  External auditory canals: Significant cerumen buildup obscuring visualization of  tympanic membranes:  No trauma, no retraction or rupture, no bulging or erythema, no air-fluid level, no cholesteatoma  Nose:  No trauma or deformity  The canals are patent with normal mucous membranes, no polyps or masses  There is no nasal discharge  There is no septal deviation  Oral cavity and throat:  Normal dentition with no evidence of gum disease  mucous membranes are normal  The airway is patent  There is no swelling or other abnormality of the uvula  Salivary glands:  Normal on inspection and palpation  There are no hemorrhages or exudates of the throat  Tonsils are involuted  There is no submandibular adenopathy  There is no submental adenopathy  Neck:  Supple, full range of motion, nontender, no deformity or trauma  There is no cervical adenopathy  Carotid arteries:  Normal upstroke and descent without bruit    There is no JVD   Trachea is midline without stridor  Thyroid:  Normal on inspection and palpation  Chest:  No trauma or deformity, no supraclavicular adenopathy, no subclavian bruits  Surgical scars: None  Chest wall expansion is symmetric normal   Lungs: There is no tachypnea or dyspnea, no use of accessory muscles of respiration  Breath sounds are normal and equal throughout, expiratory phase of respiration is not prolonged  Exam includes no wheezing or rales, no focal decreased breath sounds, no dullness to percussion  Heart:  Regular rate and rhythm, normal S1 and S2, no S4 or S3  Murmurs:  None  No cardiac rub  PMI 5th intercostal space, midclavicular line, no right ventricular heave  Back:  No trauma, no kyphosis or scoliosis, no CVA mass or tenderness, no spinal tenderness  Abdomen:  Nondistended, normal bowel sounds, soft, nontender without masses or organomegaly  There is no pulsatile mass or bruit  Hernias: none  Surgical Scars: none    Hypogastric:  No distention, no tympany or tenderness over the bladder, no mass  Inguinal Region: There is no inguinal mass or adenopathy      Arterial circulation:  +2/2 brachial, radial and ulnar pulses bilaterally  +2/2 femoral, popliteal, posterior tibial and dorsalis pedis pulses bilaterally  Extremities:  No clubbing or cyanosis are present  There is no upper extremity edema  There is no lower extremity edema  there are no varicosities   bilaterally  No phlebitic findings or calf tenderness  Joints: No significant deformity, normal function, no synovitis, no tophi  Neurologic:  Cranial nerves 2-12 are grossly intact, there is no dysmetria, there is no neurologic gait abnormality  There is no spasticity  There is no tremor  There is no masked faces, no bradykinesia or rigidity  Sensory deficit: None  Strength symmetric normal 5/5 throughout    Reflexes:  +2 biceps, triceps, patellar and Achilles, Babinski response downgoing bilaterally  Cognitive exam:  orientation as above  Executive functions:  intact  Short-term memory:  intact  Long-term memory:  intact  Language: fluent  Assessment/Plan:     1  Well adult exam         [facility] Summary:      Sarabjit Love,  It was a pleasure to meet you today! As we discussed, I will increase your dose of lisinopril to 20 mg daily  Please take 2,10 mg tablets once a day until you run out of this dose and I will send in a new prescription for the 20 mg strength  Please check your blood pressure at home every other day and forward readings to Dr Obed Rosales in 1 to 2 weeks  At your convenience, please arrange an appointment with Dr Carmel Malone office to remove earwax from both ears  Despite vitamin D replacement your vitamin D level is slightly low  Please increase your vitamin D intake to 2000 units/day        Best regards,  Dr Jarrod Gonsalez

## 2022-12-15 NOTE — PROGRESS NOTES
HEART AND VASCULAR SUMMARY    1  Lipids: Total 156, TG 95, HDL 52, LDL 85    2  ECG: Normal    3  Echocardiogram: Normal    4  Stress ECHO: Normal    5  Coronary Calcium CT: No calcium in 2018    6  The 10-year ASCVD risk score (Oz PERALES, et al , 2019) is: 11 2%    Values used to calculate the score:      Age: 65 years      Sex: Male      Is Non- : No      Diabetic: No      Tobacco smoker: No      Systolic Blood Pressure: 150 mmHg      Is BP treated: Yes      HDL Cholesterol: 52 mg/dL      Total Cholesterol: 156 mg/dL     7  HSCRP:   Lab Results   Component Value Date    HSCRP <0 90 12/15/2022       Impression and Recommendations: Your cardiac risk today calculates 11 2%, but is driven primarily by your blood pressure today  You are low to intermediate risk  It seems your blood pressure needs better control    Goal blood pressure is less than 130/80  Your cholesterol is excellent

## 2022-12-15 NOTE — PROGRESS NOTES
Your [facility] Dermatologist's Name: Siria Khan MD     Patient is a 64 yo M with a personal history of an atypical nevus on the left lower extremity s/p excision and a family history of NMSC presenting for skin screening  Skin Screening Exam:    A chaperone was present throughout the entire encounter  SKIN:  FULL ORGAN SYSTEM EXAM   Hair, Scalp, Ears, Face Normal except as noted below in Assessment   Neck, Cervical Chain Nodes Normal except as noted below in Assessment   Right Arm/Hand/Fingers Normal except as noted below in Assessment   Left Arm/Hand/Fingers Normal except as noted below in Assessment   Chest/Breasts/Axillae • Did the patient specifically refuse to have the areas "under-the-bra" examined by the Dermatologist? No  Examined areas normal except as noted below in Assessment   Abdomen, Umbilicus Normal except as noted below in Assessment   Back/Spine Normal except as noted below in Assessment   Groin/Genitalia/Buttocks • Did the patient specifically refuse to have the areas "under-the-underwear" examined by the Dermatologist? No  Examined areas normal except as noted below in Assessment   Right Leg, Foot, Toes Normal except as noted below in Assessment   Left Leg, Foot, Toes Normal except as noted below in Assessment        Assessment and Plan by Diagnosis:    SEBORRHEIC KERATOSES  - Relevant exam: Scattered over the trunk/extremities are waxy brown to black plaques and papules with stuck on appearance  - Exam and clinical history consistent with seborrheic keratoses  - Counseled that these are benign growths that do not require treatment  - Counseled that removal of lesions is considered cosmetic and so would incur a fee should patient elect to move forward     - Patient to hold on treatments for now but will inform us should they desire additional treatments    MELANOCYTIC NEVI  -Relevant exam: Scattered over the trunk/extremities are homogenously pigmented [facility] macules and papules  ELM performed and without concerning findings  - Exam and clinical history consistent with melanocytic nevi  - Educated on the ABCDE's of melanoma; handout provided  - Counseled to return to clinic prior to scheduled appointment should any of these lesions change or should any new lesions of concern arise  - Counseled on use of sun protection daily  Reviewed latest FDA sunscreen guidelines, including use of broad spectrum (UVA and UVB blocking) sunscreen or sun protective clothing with SPF 30-50 every 2-3 hours and reapplied after exposure to water; use of photoprotective clothing, including a broad brim hat and UPF rated clothing if outdoors for several hours; avoid use of tanning beds as these pose significant risk for melanoma and skin cancer  LENTIGINES  OTHER SKIN CHANGES DUE TO CHRONIC EXPOSURE TO NONIONIZING RADIATION  - Relevant exam: Over sun exposed areas are brown macules  ELM performed and without concerning findings  - Exam and clinical history consistent with lentigines  - Educated that these are indicative of prior sun exposure  - Counseled to return to clinic prior to scheduled appointment should any of these lesions change or should any new lesions of concern arise   - Recommended use of sunscreen as above and below  - Counseled on use of sun protection daily  Reviewed latest FDA sunscreen guidelines, including use of broad spectrum (UVA and UVB blocking) sunscreen or sun protective clothing with SPF 30-50 every 2-3 hours and reapplied after exposure to water; use of photoprotective clothing, including a broad brim hat and UPF rated clothing if outdoors for several hours; avoid use of tanning beds as these pose significant risk for melanoma and skin cancer      CHERRY ANGIOMAS  - Relevant exam: Scattered over the trunk/extremities are red papules  - Exam and clinical history consistent with cherry angiomas  - Educated that these are benign  - Educated that removal is considered aesthetic and would incur a fee  - Patient does not wish to pursue removal at this time but will contact us should this change  HISTORY OF ATYPICAL NEVUS  - Relevant exam: On the left lower thigh is a linear scar at site of prior atypical nevus excision without evidence of recurrence  - Continue to monitor    DERMATOFIBROMA    Physical Exam:  • Anatomic Location Affected:  Left posterior arm  • Morphological Description: Pink firm papule with surrounding hyperpigmentation  • Pertinent Positives:  • Pertinent Negatives: No itch, pain    Additional History of Present Condition: N/A    Assessment and Plan:  - History and physical consistent with dermatofibroma  - Educated that these lesions are generally benign but there are very rare subtypes that can spread to other parts of the body  - No evidence of eruptive dermatofibromas (not >15 lesions with acute onset)  - Educated the efforts to treat lesions with cryotherapy, shave biopsy and laser surgery are rarely completely successful  Based on a thorough discussion of this condition and the management approach to it (including a comprehensive discussion of the known risks, side effects and potential benefits of treatment), the patient (family) agrees to implement the following specific plan:  • Educated patient to call clinic if lesion changes (enlarges, ulcerates)         VERRUCA VULGARIS ("Common Warts")  Physical Exam:  • Anatomic Location Affected:  Right infraorbital region  • Morphological Description:  Skin colored verrucous papules  • Pertinent Positives:  • Pertinent Negatives: Additional History of Present Condition:  New over past months  Would like removed  Assessment and Plan:     - We discussed that warts are caused by a virus  - We discussed treatment options including destructive modalities and side effects  - Patient would like to hold on treatment until after the holidays   Will assist in scheduling

## 2022-12-15 NOTE — PROGRESS NOTES
Fitness Summary and Recommendations:  Dilan scored at the 80% on his body composition assessment with a bodyfat % of 18 5%  Dilan scored in the suboptimal range for flexibility with a sit & reach score of 10 cm  His Muscle Strength/Endurance scores placed him at the 95% (lower body) and 95% (upper body) with a chair stand score of 32 and arm curl score of 40 respectively  Dilan’s Cardiovascular Score of 51 1 placed him at the 95%  Overall, Amber Lindsay would be considered to have an above average fitness level with a 76% score  His overall fitness score has decreased by 2% from his previous test on 09/17/20  Continued emphasis on regular exercise and sound nutrition practices will enable Dilan to continue his optimal level of fitness

## 2022-12-16 LAB
CHEST PAIN STATEMENT: NORMAL
MAX DIASTOLIC BP: 70 MMHG
MAX HEART RATE: 151 BPM
MAX PREDICTED HEART RATE: 159 BPM
MAX. SYSTOLIC BP: 192 MMHG
PROTOCOL NAME: NORMAL
REASON FOR TERMINATION: NORMAL
TARGET HR FORMULA: NORMAL
TEST INDICATION: NORMAL
TIME IN EXERCISE PHASE: NORMAL

## 2022-12-23 ENCOUNTER — OFFICE VISIT (OUTPATIENT)
Dept: FAMILY MEDICINE CLINIC | Facility: CLINIC | Age: 62
End: 2022-12-23

## 2022-12-23 VITALS
WEIGHT: 188 LBS | HEART RATE: 76 BPM | TEMPERATURE: 97.6 F | BODY MASS INDEX: 27.85 KG/M2 | SYSTOLIC BLOOD PRESSURE: 122 MMHG | DIASTOLIC BLOOD PRESSURE: 74 MMHG | OXYGEN SATURATION: 98 % | HEIGHT: 69 IN

## 2022-12-23 DIAGNOSIS — H61.23 BILATERAL IMPACTED CERUMEN: Primary | ICD-10-CM

## 2022-12-23 DIAGNOSIS — L91.8 SKIN TAG: ICD-10-CM

## 2022-12-23 NOTE — PROGRESS NOTES
Ear cerumen removal    Date/Time: 12/23/2022 9:35 AM  Performed by: Edwin Avilez DO  Authorized by: Edwin Avilez DO   Universal Protocol:  Consent: Verbal consent obtained  Risks and benefits: risks, benefits and alternatives were discussed  Consent given by: patient  Patient understanding: patient states understanding of the procedure being performed  Patient consent: the patient's understanding of the procedure matches consent given  Patient identity confirmed: verbally with patient      Patient location:  Clinic  Procedure details:     Local anesthetic:  None    Location:  L ear and R ear    Procedure type: irrigation only      Approach:  External  Post-procedure details:     Complication:  None    Hearing quality:  Improved    Patient tolerance of procedure: Tolerated well, no immediate complications  Skin tag removal    Date/Time: 12/23/2022 9:39 AM  Performed by: Edwin Avilez DO  Authorized by: Edwin Avilez DO   Universal Protocol:  Consent: Verbal consent obtained    Consent given by: patient  Patient understanding: patient states understanding of the procedure being performed  Patient consent: the patient's understanding of the procedure matches consent given  Patient identity confirmed: verbally with patient        Procedure Details - Skin Tag Destruction:     Up to 15      Body area:  Head/neck    Head/neck location:  R cheek    Initial size (mm):  5    Final defect size (mm):  5    Malignancy: benign lesion      Destruction method: cryotherapy      Cryo area:  5mm    Cryo cycles:  2

## 2023-01-13 ENCOUNTER — TELEPHONE (OUTPATIENT)
Dept: DERMATOLOGY | Facility: CLINIC | Age: 63
End: 2023-01-13

## 2023-01-13 NOTE — TELEPHONE ENCOUNTER
Pt wants to schedule a wart removal please contact to get scheduled( pt was seen at [facility] so should be fit in sooner rather than later)

## 2023-03-13 ENCOUNTER — TELEPHONE (OUTPATIENT)
Dept: FAMILY MEDICINE CLINIC | Facility: CLINIC | Age: 63
End: 2023-03-13

## 2023-03-13 NOTE — TELEPHONE ENCOUNTER
T/c from pt - pt is going to fax to us what is specifically needed in the letter in order for his flex spending to approve the skin tag removal   Fax # has been verified by pt  Lactate 2.7

## 2023-03-13 NOTE — TELEPHONE ENCOUNTER
Pt just needs a letter stating why he had it removed  It's not covered by insurance and is paying with his flex spending card  Pt is unsure why the insurance is questioning it but asking if Dr Cornelius Villegas can write a letter stating he got it removed due to appearance or it was an "eye sore"  He had this removed by Dr Cornelius Villegas in December       Please advise

## 2023-03-22 DIAGNOSIS — I10 ESSENTIAL HYPERTENSION: ICD-10-CM

## 2023-03-22 RX ORDER — LISINOPRIL 20 MG/1
TABLET ORAL
Qty: 30 TABLET | Refills: 2 | Status: SHIPPED | OUTPATIENT
Start: 2023-03-22

## 2023-03-23 DIAGNOSIS — N52.9 ERECTILE DYSFUNCTION, UNSPECIFIED ERECTILE DYSFUNCTION TYPE: ICD-10-CM

## 2023-03-23 RX ORDER — SILDENAFIL 50 MG/1
TABLET, FILM COATED ORAL
Qty: 10 TABLET | Refills: 0 | Status: SHIPPED | OUTPATIENT
Start: 2023-03-23

## 2023-04-26 DIAGNOSIS — I10 ESSENTIAL HYPERTENSION: ICD-10-CM

## 2023-04-26 RX ORDER — LISINOPRIL 20 MG/1
20 TABLET ORAL DAILY
Qty: 90 TABLET | Refills: 3 | Status: SHIPPED | OUTPATIENT
Start: 2023-04-26

## 2023-05-15 ENCOUNTER — OFFICE VISIT (OUTPATIENT)
Dept: FAMILY MEDICINE CLINIC | Facility: CLINIC | Age: 63
End: 2023-05-15

## 2023-05-15 VITALS
SYSTOLIC BLOOD PRESSURE: 120 MMHG | WEIGHT: 185 LBS | HEIGHT: 69 IN | HEART RATE: 64 BPM | DIASTOLIC BLOOD PRESSURE: 78 MMHG | TEMPERATURE: 97.4 F | BODY MASS INDEX: 27.4 KG/M2

## 2023-05-15 DIAGNOSIS — L30.9 DERMATITIS: Primary | ICD-10-CM

## 2023-05-15 RX ORDER — CLOBETASOL PROPIONATE 0.5 MG/G
OINTMENT TOPICAL 2 TIMES DAILY
Qty: 30 G | Refills: 1 | Status: SHIPPED | OUTPATIENT
Start: 2023-05-15

## 2023-05-15 RX ORDER — METHYLPREDNISOLONE 4 MG/1
TABLET ORAL
Qty: 21 EACH | Refills: 0 | Status: SHIPPED | OUTPATIENT
Start: 2023-05-15

## 2023-05-15 NOTE — PROGRESS NOTES
BMI Counseling: Body mass index is 27 32 kg/m²  The BMI is above normal  Nutrition recommendations include decreasing portion sizes, decreasing fast food intake, limiting drinks that contain sugar, moderation in carbohydrate intake, increasing intake of lean protein, reducing intake of saturated and trans fat and reducing intake of cholesterol  Exercise recommendations include moderate physical activity 150 minutes/week and exercising 3-5 times per week  Rationale for BMI follow-up plan is due to patient being overweight or obese  Assessment/Plan:     Chronic Problems:  No problem-specific Assessment & Plan notes found for this encounter  Visit Diagnosis:  Diagnoses and all orders for this visit:    Dermatitis  -     clobetasol (TEMOVATE) 0 05 % ointment; Apply topically 2 (two) times a day  -     methylPREDNISolone 4 MG tablet therapy pack; Use as directed on package    Reviewed plan of care, reviewed history and potential causative factors, discussed treatment, avoidance call for any changes failure to resolve      Subjective:    Patient ID: Luis Alberto Choi is a 65 y o  male  Developed rash , to palms of hand and neck area   Power washing , lemon pledge   Denies any changes lotions creams or oils detergents  Developed rash to neck, palms of hand interdigital  Pruritic  Has had similar occurrence approximately this time last year          The following portions of the patient's history were reviewed and updated as appropriate: allergies, current medications, past family history, past medical history, past social history, past surgical history and problem list     Review of Systems   Constitutional: Negative for appetite change, chills, fever and unexpected weight change  HENT: Negative for congestion, dental problem, ear pain, hearing loss, postnasal drip, rhinorrhea, sinus pressure, sinus pain, sneezing, sore throat, tinnitus and voice change  Eyes: Negative for visual disturbance  "  Respiratory: Negative for apnea, cough, chest tightness and shortness of breath  Cardiovascular: Negative for chest pain, palpitations and leg swelling  Gastrointestinal: Negative for abdominal pain, blood in stool, constipation, diarrhea, nausea and vomiting  Endocrine: Negative for cold intolerance, heat intolerance, polydipsia, polyphagia and polyuria  Genitourinary: Negative for decreased urine volume, difficulty urinating, dysuria, frequency and hematuria  Musculoskeletal: Negative for arthralgias, back pain, gait problem, joint swelling and myalgias  Skin: Negative for color change, rash and wound  Allergic/Immunologic: Negative for environmental allergies and food allergies  Neurological: Negative for dizziness, syncope, weakness, light-headedness, numbness and headaches  Hematological: Negative for adenopathy  Does not bruise/bleed easily  Psychiatric/Behavioral: Negative for sleep disturbance and suicidal ideas  The patient is not nervous/anxious  /78   Pulse 64   Temp (!) 97 4 °F (36 3 °C)   Ht 5' 9\" (1 753 m)   Wt 83 9 kg (185 lb)   BMI 27 32 kg/m²   Social History     Socioeconomic History   • Marital status:      Spouse name: Not on file   • Number of children: 2   • Years of education: Not on file   • Highest education level: Not on file   Occupational History   • Occupation: [employer], Human Resources      Comment: [id number]   Tobacco Use   • Smoking status: Never   • Smokeless tobacco: Never   Vaping Use   • Vaping Use: Never used   Substance and Sexual Activity   • Alcohol use:  Yes     Alcohol/week: 7 0 - 10 0 standard drinks     Types: 7 - 10 Standard drinks or equivalent per week   • Drug use: No   • Sexual activity: Yes     Partners: Female   Other Topics Concern   • Not on file   Social History Narrative    [employer],  [employer]    Patient exercises 4 - 5 times per week (treadmill, weights, weight machine, " roxanna  Social Determinants of Health     Financial Resource Strain: Not on file   Food Insecurity: Not on file   Transportation Needs: Not on file   Physical Activity: Not on file   Stress: Not on file   Social Connections: Not on file   Intimate Partner Violence: Not on file   Housing Stability: Not on file     Past Medical History:   Diagnosis Date   • Dysplastic nevus, severe vs  early melanoma 7/1/2019   • Essential hypertension 2/6/2018   • Hyperlipidemia    • Impaired fasting glucose 9/16/2020     Family History   Problem Relation Age of Onset   • Heart disease Father    • Hyperlipidemia Father    • Hyperlipidemia Brother      Past Surgical History:   Procedure Laterality Date   • COLONOSCOPY  2001   • COLONOSCOPY  2010    Virtual colonoscopy - clear  • KNEE ARTHROSCOPY Left 2006    Meniscus  • KNEE ARTHROSCOPY Right 2016    Meniscus  • [place] COLONOSCOPY FLX DX W/COLLJ SPEC WHEN PFRMD N/A 3/27/2018    Procedure: COLONOSCOPY;  Surgeon: Errol Samuels MD;  Location: [facility];   Service: Gastroenterology   • TONSILLECTOMY  1969       Current Outpatient Medications:   •  atorvastatin (LIPITOR) 10 mg tablet, Take 1 tablet (10 mg total) by mouth daily, Disp: 90 tablet, Rfl: 3  •  calcium gluconate 500 mg tablet, Take 1 tablet (500 mg total) by mouth 2 (two) times a day for 30 days, Disp: 60 tablet, Rfl: 11  •  cholecalciferol (VITAMIN D3) 1,000 units tablet, Take 1 tablet (1,000 Units total) by mouth daily for 30 days (Patient taking differently: Take 2,000 Units by mouth daily), Disp: 30 tablet, Rfl: 11  •  clobetasol (TEMOVATE) 0 05 % ointment, Apply topically 2 (two) times a day, Disp: 30 g, Rfl: 1  •  lisinopril (ZESTRIL) 20 mg tablet, Take 1 tablet (20 mg total) by mouth daily, Disp: 90 tablet, Rfl: 3  •  methylPREDNISolone 4 MG tablet therapy pack, Use as directed on package, Disp: 21 each, Rfl: 0  •  Omega 3 1200 MG CAPS, Take 1 capsule (1,200 mg total) by mouth 2 (two) times a day for 30 days, Disp: 60 capsule, Rfl: 11  •  sildenafil (VIAGRA) 50 MG tablet, take 1 tablet by mouth 1 hour prior to intercourse, Disp: 10 tablet, Rfl: 0    Allergies   Allergen Reactions   • Penicillins Rash          Lab Review   No visits with results within 2 Month(s) from this visit  Latest known visit with results is:   Hospital Outpatient Visit on 12/15/2022   Component Date Value   • Baseline HR 12/15/2022 71    • Baseline BP 12/15/2022 142/86    • O2 sat rest 12/15/2022 99    • Stress peak HR 12/15/2022 151    • Post peak BP 12/15/2022 192    • Rate Pressure Product 12/15/2022 28,992 0    • O2 sat peak 12/15/2022 97    • Recovery HR 12/15/2022 101    • Recovery BP 12/15/2022 162/78    • Max HR 12/15/2022 151    • Max HR Percent 12/15/2022 94    • Exercise duration (min) 12/15/2022 10    • Estimated workload 12/15/2022 11 7    • Angina Index 12/15/2022 0    • Stress Stage Reached 12/15/2022 4 0    • Protocol Name 12/15/2022 MODE    • Time In Exercise Phase 12/15/2022 00:09:59    • MAX  SYSTOLIC BP 78/39/4426 897    • Max Diastolic Bp 35/89/9084 70    • Max Heart Rate 12/15/2022 151    • Max Predicted Heart Rate 12/15/2022 159    • Reason for Termination 12/15/2022 Target Heart Rate Achieved    • Test Indication 12/15/2022 Screening for CAD    • Target Hr Formular 12/15/2022 (220 - Age)*100%    • Chest Pain Statement 12/15/2022 none         Imaging: No results found  Objective:     Physical Exam  Constitutional:       General: He is not in acute distress  Appearance: He is not ill-appearing or toxic-appearing  HENT:      Head: Normocephalic and atraumatic  Pulmonary:      Effort: Pulmonary effort is normal    Skin:     Findings: Rash present  Neurological:      Mental Status: He is oriented to person, place, and time  There are no Patient Instructions on file for this visit  MYCHAL Bowens    Portions of the record may have been created with voice recognition software    Occasional "wrong word or \"sound a like\" substitutions may have occurred due to the inherent limitations of voice recognition software  Read the chart carefully and recognize, using context, where substitutions have occurred    "

## 2023-08-21 DIAGNOSIS — N52.9 ERECTILE DYSFUNCTION, UNSPECIFIED ERECTILE DYSFUNCTION TYPE: ICD-10-CM

## 2023-08-21 RX ORDER — SILDENAFIL 50 MG/1
TABLET, FILM COATED ORAL
Qty: 10 TABLET | Refills: 0 | Status: SHIPPED | OUTPATIENT
Start: 2023-08-21

## 2023-10-02 DIAGNOSIS — N52.9 ERECTILE DYSFUNCTION, UNSPECIFIED ERECTILE DYSFUNCTION TYPE: ICD-10-CM

## 2023-10-02 RX ORDER — SILDENAFIL 50 MG/1
50 TABLET, FILM COATED ORAL AS NEEDED
Qty: 10 TABLET | Refills: 0 | Status: SHIPPED | OUTPATIENT
Start: 2023-10-02

## 2023-10-31 DIAGNOSIS — Z00.00 ENCOUNTER FOR PREVENTIVE HEALTH EXAMINATION: Primary | ICD-10-CM

## 2023-12-05 DIAGNOSIS — E78.49 OTHER HYPERLIPIDEMIA: ICD-10-CM

## 2023-12-05 RX ORDER — ATORVASTATIN CALCIUM 10 MG/1
10 TABLET, FILM COATED ORAL DAILY
Qty: 90 TABLET | Refills: 3 | Status: SHIPPED | OUTPATIENT
Start: 2023-12-05

## 2024-01-02 ENCOUNTER — OFFICE VISIT (OUTPATIENT)
Dept: FAMILY MEDICINE CLINIC | Facility: CLINIC | Age: 64
End: 2024-01-02
Payer: COMMERCIAL

## 2024-01-02 VITALS
SYSTOLIC BLOOD PRESSURE: 122 MMHG | BODY MASS INDEX: 27.05 KG/M2 | WEIGHT: 182.6 LBS | HEART RATE: 59 BPM | TEMPERATURE: 95.9 F | HEIGHT: 69 IN | OXYGEN SATURATION: 100 % | DIASTOLIC BLOOD PRESSURE: 78 MMHG

## 2024-01-02 DIAGNOSIS — R05.1 ACUTE COUGH: ICD-10-CM

## 2024-01-02 DIAGNOSIS — Z00.00 WELL ADULT EXAM: Primary | ICD-10-CM

## 2024-01-02 DIAGNOSIS — B34.9 VIRAL ILLNESS: Primary | ICD-10-CM

## 2024-01-02 PROCEDURE — 99213 OFFICE O/P EST LOW 20 MIN: CPT | Performed by: NURSE PRACTITIONER

## 2024-01-02 RX ORDER — DEXTROMETHORPHAN HYDROBROMIDE AND PROMETHAZINE HYDROCHLORIDE 15; 6.25 MG/5ML; MG/5ML
2.5 SYRUP ORAL 4 TIMES DAILY PRN
Qty: 180 ML | Refills: 0 | Status: SHIPPED | OUTPATIENT
Start: 2024-01-02

## 2024-01-02 NOTE — PROGRESS NOTES
Name: Cristian Hinds      : 1960      MRN: [id number]  Encounter Provider: MYCHAL Verduzco  Encounter Date: 2024   Encounter department: [facility] [address]    Assessment & Plan     1. Viral illness  Comments:  Advised otc mucinex and flonase. Advised rest and fluids. advised to call if not improving.    2. Acute cough  -     promethazine-dextromethorphan (PHENERGAN-DM) 6.25-15 mg/5 mL oral syrup; Take 2.5 mL by mouth 4 (four) times a day as needed for cough           Subjective      Patient presents for sick visit.  This started about 3 days ago. Denies fever. He took tylenol this morning which helped a little bit.      Review of Systems   Constitutional:  Positive for appetite change and fatigue. Negative for chills, diaphoresis and fever.   HENT:  Positive for congestion, postnasal drip, rhinorrhea, sinus pressure, sinus pain and sore throat.    Respiratory:  Positive for cough. Negative for chest tightness and wheezing.    Gastrointestinal:  Negative for diarrhea, nausea and vomiting.   Neurological:  Positive for headaches.       Current Outpatient Medications on File Prior to Visit   Medication Sig    atorvastatin (LIPITOR) 10 mg tablet TAKE 1 TABLET BY MOUTH EVERY DAY    lisinopril (ZESTRIL) 20 mg tablet Take 1 tablet (20 mg total) by mouth daily    sildenafil (VIAGRA) 50 MG tablet Take 1 tablet (50 mg total) by mouth as needed for erectile dysfunction for up to 10 doses    cholecalciferol (VITAMIN D3) 1,000 units tablet Take 1 tablet (1,000 Units total) by mouth daily for 30 days (Patient taking differently: Take 2,000 Units by mouth daily)    Omega 3 1200 MG CAPS Take 1 capsule (1,200 mg total) by mouth 2 (two) times a day for 30 days    [DISCONTINUED] calcium gluconate 500 mg tablet Take 1 tablet (500 mg total) by mouth 2 (two) times a day for 30 days    [DISCONTINUED] clobetasol (TEMOVATE) 0.05 % ointment Apply topically 2 (two) times a day     "[DISCONTINUED] methylPREDNISolone 4 MG tablet therapy pack Use as directed on package       Objective     /78 (BP Location: Left arm, Patient Position: Sitting, Cuff Size: Standard)   Pulse 59   Temp (!) 95.9 °F (35.5 °C) (Tympanic)   Ht 5' 9\" (1.753 m)   Wt 82.8 kg (182 lb 9.6 oz)   SpO2 100%   BMI 26.97 kg/m²     Physical Exam  Constitutional:       General: He is not in acute distress.     Appearance: He is well-developed.   HENT:      Right Ear: Tympanic membrane normal.      Left Ear: Tympanic membrane normal.      Nose: Congestion present.      Mouth/Throat:      Pharynx: Posterior oropharyngeal erythema present. No oropharyngeal exudate.      Tonsils: 0 on the right. 0 on the left.   Cardiovascular:      Rate and Rhythm: Normal rate and regular rhythm.      Heart sounds: Normal heart sounds. No murmur heard.     No gallop.   Pulmonary:      Effort: Pulmonary effort is normal. No respiratory distress.      Breath sounds: Normal breath sounds. No wheezing.   Musculoskeletal:         General: Normal range of motion.   Lymphadenopathy:      Cervical: No cervical adenopathy.   Skin:     General: Skin is warm and dry.   Neurological:      Mental Status: He is alert and oriented to person, place, and time.       MYCHAL Verduzco    "

## 2024-01-16 ENCOUNTER — OFFICE VISIT (OUTPATIENT)
Dept: FAMILY MEDICINE CLINIC | Facility: CLINIC | Age: 64
End: 2024-01-16

## 2024-01-16 ENCOUNTER — HOSPITAL ENCOUNTER (OUTPATIENT)
Dept: NON INVASIVE DIAGNOSTICS | Facility: CLINIC | Age: 64
Discharge: HOME | End: 2024-01-16

## 2024-01-16 ENCOUNTER — APPOINTMENT (OUTPATIENT)
Dept: LAB | Facility: CLINIC | Age: 64
End: 2024-01-16

## 2024-01-16 VITALS
WEIGHT: 181 LBS | DIASTOLIC BLOOD PRESSURE: 74 MMHG | HEART RATE: 98 BPM | SYSTOLIC BLOOD PRESSURE: 124 MMHG | OXYGEN SATURATION: 98 % | HEIGHT: 69 IN | RESPIRATION RATE: 14 BRPM | BODY MASS INDEX: 26.81 KG/M2

## 2024-01-16 VITALS
HEIGHT: 69 IN | BODY MASS INDEX: 26.81 KG/M2 | DIASTOLIC BLOOD PRESSURE: 74 MMHG | WEIGHT: 181 LBS | SYSTOLIC BLOOD PRESSURE: 124 MMHG | HEART RATE: 84 BPM

## 2024-01-16 DIAGNOSIS — Z00.00 ENCOUNTER FOR PREVENTIVE HEALTH EXAMINATION: ICD-10-CM

## 2024-01-16 DIAGNOSIS — E55.9 VITAMIN D DEFICIENCY: ICD-10-CM

## 2024-01-16 DIAGNOSIS — Z00.00 WELL ADULT EXAM: Primary | ICD-10-CM

## 2024-01-16 DIAGNOSIS — I10 ESSENTIAL HYPERTENSION: ICD-10-CM

## 2024-01-16 DIAGNOSIS — E78.2 MIXED HYPERLIPIDEMIA: ICD-10-CM

## 2024-01-16 DIAGNOSIS — N52.9 ERECTILE DYSFUNCTION, UNSPECIFIED ERECTILE DYSFUNCTION TYPE: ICD-10-CM

## 2024-01-16 DIAGNOSIS — N40.0 BPH WITHOUT OBSTRUCTION/LOWER URINARY TRACT SYMPTOMS: ICD-10-CM

## 2024-01-16 DIAGNOSIS — E80.4 GILBERT'S SYNDROME: ICD-10-CM

## 2024-01-16 LAB
25(OH)D3 SERPL-MCNC: 27.8 NG/ML (ref 30–100)
ALBUMIN SERPL BCP-MCNC: 4.3 G/DL (ref 3.5–5)
ALP SERPL-CCNC: 57 U/L (ref 34–104)
ALT SERPL W P-5'-P-CCNC: 34 U/L (ref 7–52)
ANION GAP SERPL CALCULATED.3IONS-SCNC: 7 MMOL/L
AORTIC ROOT: 3.4 CM
APICAL FOUR CHAMBER EJECTION FRACTION: 63 %
ASCENDING AORTA: 3.5 CM
AST SERPL W P-5'-P-CCNC: 20 U/L (ref 13–39)
ATRIAL RATE: 70 BPM
BACTERIA UR QL AUTO: NORMAL /HPF
BASOPHILS # BLD AUTO: 0.05 THOUSANDS/ÂΜL (ref 0–0.1)
BASOPHILS NFR BLD AUTO: 1 % (ref 0–1)
BILIRUB SERPL-MCNC: 1.12 MG/DL (ref 0.2–1)
BILIRUB UR QL STRIP: NEGATIVE
BSA FOR ECHO PROCEDURE: 1.98 M2
BUN SERPL-MCNC: 17 MG/DL (ref 5–25)
CALCIUM SERPL-MCNC: 9.4 MG/DL (ref 8.4–10.2)
CHEST PAIN STATEMENT: NORMAL
CHLORIDE SERPL-SCNC: 103 MMOL/L (ref 96–108)
CHOLEST SERPL-MCNC: 153 MG/DL
CLARITY UR: CLEAR
CO2 SERPL-SCNC: 27 MMOL/L (ref 21–32)
COLOR UR: COLORLESS
CREAT SERPL-MCNC: 0.82 MG/DL (ref 0.6–1.3)
CRP SERPL HS-MCNC: 1.9 MG/L
E WAVE DECELERATION TIME: 212 MS
E/A RATIO: 1.06
EOSINOPHIL # BLD AUTO: 0.09 THOUSAND/ÂΜL (ref 0–0.61)
EOSINOPHIL NFR BLD AUTO: 1 % (ref 0–6)
ERYTHROCYTE [DISTWIDTH] IN BLOOD BY AUTOMATED COUNT: 12.1 % (ref 11.6–15.1)
EST. AVERAGE GLUCOSE BLD GHB EST-MCNC: 108 MG/DL
FRACTIONAL SHORTENING: 30 (ref 28–44)
GFR SERPL CREATININE-BSD FRML MDRD: 94 ML/MIN/1.73SQ M
GLUCOSE P FAST SERPL-MCNC: 104 MG/DL (ref 65–99)
GLUCOSE UR STRIP-MCNC: NEGATIVE MG/DL
HBA1C MFR BLD: 5.4 %
HCT VFR BLD AUTO: 45.6 % (ref 36.5–49.3)
HDLC SERPL-MCNC: 48 MG/DL
HGB BLD-MCNC: 15.3 G/DL (ref 12–17)
HGB UR QL STRIP.AUTO: NEGATIVE
IMM GRANULOCYTES # BLD AUTO: 0.03 THOUSAND/UL (ref 0–0.2)
IMM GRANULOCYTES NFR BLD AUTO: 0 % (ref 0–2)
INTERVENTRICULAR SEPTUM IN DIASTOLE (PARASTERNAL SHORT AXIS VIEW): 0.9 CM
INTERVENTRICULAR SEPTUM: 0.9 CM (ref 0.6–1.1)
KETONES UR STRIP-MCNC: NEGATIVE MG/DL
LAAS-AP2: 14.1 CM2
LAAS-AP4: 15.6 CM2
LDLC SERPL CALC-MCNC: 85 MG/DL (ref 0–100)
LEFT ATRIUM AREA SYSTOLE SINGLE PLANE A4C: 16.8 CM2
LEFT ATRIUM SIZE: 3.3 CM
LEFT ATRIUM VOLUME (MOD BIPLANE): 37 ML
LEFT ATRIUM VOLUME INDEX (MOD BIPLANE): 18.7 ML/M2
LEFT INTERNAL DIMENSION IN SYSTOLE: 3.1 CM (ref 2.1–4)
LEFT VENTRICULAR INTERNAL DIMENSION IN DIASTOLE: 4.4 CM (ref 3.5–6)
LEFT VENTRICULAR POSTERIOR WALL IN END DIASTOLE: 0.9 CM
LEFT VENTRICULAR STROKE VOLUME: 50 ML
LEUKOCYTE ESTERASE UR QL STRIP: NEGATIVE
LVSV (TEICH): 50 ML
LYMPHOCYTES # BLD AUTO: 2.66 THOUSANDS/ÂΜL (ref 0.6–4.47)
LYMPHOCYTES NFR BLD AUTO: 34 % (ref 14–44)
MAX DIASTOLIC BP: 70 MMHG
MAX HR PERCENT: 92 %
MAX HR: 146 BPM
MAX PREDICTED HEART RATE: 158 BPM
MCH RBC QN AUTO: 31.4 PG (ref 26.8–34.3)
MCHC RBC AUTO-ENTMCNC: 33.6 G/DL (ref 31.4–37.4)
MCV RBC AUTO: 94 FL (ref 82–98)
MONOCYTES # BLD AUTO: 0.71 THOUSAND/ÂΜL (ref 0.17–1.22)
MONOCYTES NFR BLD AUTO: 9 % (ref 4–12)
MV E'TISSUE VEL-SEP: 12 CM/S
MV PEAK A VEL: 0.67 M/S
MV PEAK E VEL: 71 CM/S
MV STENOSIS PRESSURE HALF TIME: 62 MS
MV VALVE AREA P 1/2 METHOD: 3.55
NEUTROPHILS # BLD AUTO: 4.26 THOUSANDS/ÂΜL (ref 1.85–7.62)
NEUTS SEG NFR BLD AUTO: 55 % (ref 43–75)
NITRITE UR QL STRIP: NEGATIVE
NON-SQ EPI CELLS URNS QL MICRO: NORMAL /HPF
NRBC BLD AUTO-RTO: 0 /100 WBCS
P AXIS: 67 DEGREES
PH UR STRIP.AUTO: 5 [PH]
PLATELET # BLD AUTO: 191 THOUSANDS/UL (ref 149–390)
PMV BLD AUTO: 11.9 FL (ref 8.9–12.7)
POST LVEF: 70 %
POTASSIUM SERPL-SCNC: 4 MMOL/L (ref 3.5–5.3)
PR INTERVAL: 158 MS
PROT SERPL-MCNC: 7.1 G/DL (ref 6.4–8.4)
PROT UR STRIP-MCNC: NEGATIVE MG/DL
PROTOCOL NAME: NORMAL
PSA SERPL-MCNC: 2.01 NG/ML (ref 0–4)
QRS AXIS: 68 DEGREES
QRSD INTERVAL: 90 MS
QT INTERVAL: 406 MS
QTC INTERVAL: 438 MS
RATE PRESSURE PRODUCT: NORMAL
RBC # BLD AUTO: 4.87 MILLION/UL (ref 3.88–5.62)
RBC #/AREA URNS AUTO: NORMAL /HPF
REASON FOR TERMINATION: NORMAL
RIGHT ATRIUM AREA SYSTOLE A4C: 14.1 CM2
RIGHT VENTRICLE ID DIMENSION: 2.7 CM
SL CV LEFT ATRIUM LENGTH A2C: 4.8 CM
SL CV LV EF: 60
SL CV LV EF: 60
SL CV PED ECHO LEFT VENTRICLE DIASTOLIC VOLUME (MOD BIPLANE) 2D: 89 ML
SL CV PED ECHO LEFT VENTRICLE SYSTOLIC VOLUME (MOD BIPLANE) 2D: 39 ML
SL CV STRESS RECOVERY BP: NORMAL MMHG
SL CV STRESS RECOVERY HR: 95 BPM
SL CV STRESS RECOVERY O2 SAT: 97 %
SODIUM SERPL-SCNC: 137 MMOL/L (ref 135–147)
SP GR UR STRIP.AUTO: 1 (ref 1–1.03)
STRESS ANGINA INDEX: 0
STRESS BASELINE BP: NORMAL MMHG
STRESS BASELINE HR: 83 BPM
STRESS DUKE TREADMILL SCORE: -1
STRESS O2 SAT REST: 98 %
STRESS PEAK HR: 146 BPM
STRESS POST ESTIMATED WORKLOAD: 10.1 METS
STRESS POST EXERCISE DUR MIN: 9 MIN
STRESS POST EXERCISE DUR MIN: 9 MIN
STRESS POST EXERCISE DUR SEC: 0 SEC
STRESS POST O2 SAT PEAK: 97 %
STRESS POST PEAK BP: 182 MMHG
STRESS POST PEAK HR: 146 BPM
STRESS POST PEAK SYSTOLIC BP: 182 MMHG
STRESS ST DEPRESSION: 2 MM
T WAVE AXIS: 51 DEGREES
TARGET HR FORMULA: NORMAL
TEST INDICATION: NORMAL
TRICUSPID ANNULAR PLANE SYSTOLIC EXCURSION: 2.3 CM
TRIGL SERPL-MCNC: 99 MG/DL
TSH SERPL DL<=0.05 MIU/L-ACNC: 1.16 UIU/ML (ref 0.45–4.5)
UROBILINOGEN UR STRIP-ACNC: <2 MG/DL
VENTRICULAR RATE: 70 BPM
WBC # BLD AUTO: 7.8 THOUSAND/UL (ref 4.31–10.16)
WBC #/AREA URNS AUTO: NORMAL /HPF

## 2024-01-16 PROCEDURE — 80053 COMPREHEN METABOLIC PANEL: CPT

## 2024-01-16 PROCEDURE — 80061 LIPID PANEL: CPT

## 2024-01-16 PROCEDURE — 36415 COLL VENOUS BLD VENIPUNCTURE: CPT

## 2024-01-16 PROCEDURE — 86141 C-REACTIVE PROTEIN HS: CPT

## 2024-01-16 PROCEDURE — 84153 ASSAY OF PSA TOTAL: CPT

## 2024-01-16 PROCEDURE — 93306 TTE W/DOPPLER COMPLETE: CPT | Performed by: INTERNAL MEDICINE

## 2024-01-16 PROCEDURE — 83036 HEMOGLOBIN GLYCOSYLATED A1C: CPT

## 2024-01-16 PROCEDURE — 85025 COMPLETE CBC W/AUTO DIFF WBC: CPT

## 2024-01-16 PROCEDURE — 84443 ASSAY THYROID STIM HORMONE: CPT

## 2024-01-16 PROCEDURE — 93306 TTE W/DOPPLER COMPLETE: CPT

## 2024-01-16 PROCEDURE — 93005 ELECTROCARDIOGRAM TRACING: CPT

## 2024-01-16 PROCEDURE — 82306 VITAMIN D 25 HYDROXY: CPT

## 2024-01-16 PROCEDURE — 93350 STRESS TTE ONLY: CPT

## 2024-01-16 PROCEDURE — 99499EX: Performed by: FAMILY MEDICINE

## 2024-01-16 PROCEDURE — 81001 URINALYSIS AUTO W/SCOPE: CPT

## 2024-01-16 PROCEDURE — 93350 STRESS TTE ONLY: CPT | Performed by: INTERNAL MEDICINE

## 2024-01-16 NOTE — ASSESSMENT & PLAN NOTE
Your bilirubin level today is still mildly elevated, but stable (1.12).  Fortunately, your previous imaging studies of your liver have been normal.  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  I would recommend continued yearly labs to monitor this.

## 2024-01-16 NOTE — ASSESSMENT & PLAN NOTE
It appears that you are doing well with occasional use of sildenafil (Viagra).  I think it is reasonable to continue this medication.

## 2024-01-16 NOTE — PROGRESS NOTES
[facility] Physical Exam Half Day      Cristian Hinds is a 63 y.o. male who is presenting for his 6th [facility] Physical Exam at [facility]. Dilan is the ,  at [employer]. He and his family reside in the [facility].    Dilan's past medical history is significant for hypertension, hyperlipidemia, Gilbert's Syndrome, BPH, elevated blood sugar, vitamin D deficiency, and ED. his current medications include lisinopril 20 mg daily, atorvastatin 10 mg daily, sildenafil 50 mg as needed.  He also takes a number of OTC supplements including vitamin D, omega-3, and saw palmetto.    His past surgical history is significant for tonsillectomy in 1969, left knee arthroscopy for meniscal tear in 2006, right knee arthroscopy for meniscal tear in 2016, and removal of precancerous skin lesion left thigh in 2019.    Dilan's family history is significant for cardiovascular disease and hyperlipidemia (father), and hypertension (brother).     He is a non-smoker.  He drinks approximately 4 alcoholic beverages per week (mostly wine and beer).  He consumes minimal amount of caffeine.  He states his diet is mostly healthy, with occasionally some indiscretions over the weekends.  He exercises 5 days/week (weight training and treadmill at his company's fitness [employer]).     He does not have any concerns today.                Review of Systems   Constitutional:  Negative for chills and fever.   HENT:  Negative for ear pain and sore throat.    Eyes:  Negative for pain and visual disturbance.   Respiratory:  Negative for cough and shortness of breath.    Cardiovascular:  Negative for chest pain and palpitations.   Gastrointestinal:  Negative for abdominal pain and vomiting.   Genitourinary:  Negative for dysuria and hematuria.   Musculoskeletal:  Negative for arthralgias and back pain.   Skin:  Negative for color change and rash.   Neurological:  Negative  for seizures and syncope.   All other systems reviewed and are negative.        Active Ambulatory Problems     Diagnosis Date Noted   • Essential hypertension 02/06/2018   • BPH without obstruction/lower urinary tract symptoms 02/06/2018   • Gilbert's syndrome 09/17/2020   • Mixed hyperlipidemia 09/17/2020   • Well adult exam 11/30/2020   • Vitamin D deficiency 11/23/2021   • Erectile dysfunction 11/23/2021     Resolved Ambulatory Problems     Diagnosis Date Noted   • Bilateral impacted cerumen 02/06/2018   • Elevated PSA measurement 04/16/2019   • Impaired fasting glucose 09/16/2020   • Dysplastic nevus, severe vs. early melanoma left thigh 07/2019   • Thrombocytopenia (HCC) 11/23/2021     Past Medical History:   Diagnosis Date   • BPH (benign prostatic hyperplasia)    • ED (erectile dysfunction)    • Gilbert syndrome    • Hyperlipidemia        Past Surgical History:   Procedure Laterality Date   • COLONOSCOPY  2001   • COLONOSCOPY  2010    Virtual colonoscopy - clear.   • KNEE ARTHROSCOPY Left 2006    Meniscus.   • KNEE ARTHROSCOPY Right 2016    Meniscus.   • [place] COLONOSCOPY FLX DX W/COLLJ SPEC WHEN PFRMD N/A 3/27/2018    Procedure: COLONOSCOPY;  Surgeon: Jean Shaffer MD;  Location: [facility];  Service: Gastroenterology   • TONSILLECTOMY  1969       Family History   Problem Relation Age of Onset   • Heart disease Father    • Hyperlipidemia Father    • Hyperlipidemia Brother        Social History     Tobacco Use   Smoking Status Never   Smokeless Tobacco Never         Current Outpatient Medications:   •  atorvastatin (LIPITOR) 10 mg tablet, TAKE 1 TABLET BY MOUTH EVERY DAY, Disp: 90 tablet, Rfl: 3  •  cholecalciferol (VITAMIN D3) 1,000 units tablet, Take 1 tablet (1,000 Units total) by mouth daily for 30 days (Patient taking differently: Take 2,000 Units by mouth daily), Disp: 30 tablet, Rfl: 11  •  lisinopril (ZESTRIL) 20 mg tablet, Take 1 tablet (20 mg total) by mouth daily, Disp: 90 tablet, Rfl: 3  •  Omega  3 1200 MG CAPS, Take 1 capsule (1,200 mg total) by mouth 2 (two) times a day for 30 days, Disp: 60 capsule, Rfl: 11  •  sildenafil (VIAGRA) 50 MG tablet, Take 1 tablet (50 mg total) by mouth as needed for erectile dysfunction for up to 10 doses, Disp: 10 tablet, Rfl: 0    Allergies   Allergen Reactions   • Penicillins Rash         Objective:     Physical Exam  Constitutional:       Appearance: Normal appearance.   HENT:      Head: Normocephalic and atraumatic.      Right Ear: Tympanic membrane, ear canal and external ear normal. There is no impacted cerumen.      Left Ear: Tympanic membrane, ear canal and external ear normal. There is no impacted cerumen.      Nose: Nose normal.      Mouth/Throat:      Mouth: Mucous membranes are moist.      Pharynx: Oropharynx is clear. No posterior oropharyngeal erythema.   Eyes:      Extraocular Movements: Extraocular movements intact.      Conjunctiva/sclera: Conjunctivae normal.      Pupils: Pupils are equal, round, and reactive to light.   Neck:      Vascular: No carotid bruit.   Cardiovascular:      Rate and Rhythm: Normal rate and regular rhythm.      Pulses: Normal pulses.      Heart sounds: Normal heart sounds. No murmur heard.  Pulmonary:      Effort: Pulmonary effort is normal.      Breath sounds: Normal breath sounds.   Abdominal:      General: Abdomen is flat. Bowel sounds are normal. There is no distension.      Palpations: Abdomen is soft. There is no mass.      Tenderness: There is no abdominal tenderness.      Hernia: No hernia is present.   Genitourinary:     Rectum: Guaiac result negative.      Comments: PARKER: Prostate mildly enlarged (1.5).  No asymmetry.  No nodules.  Heme-negative stool.  Musculoskeletal:         General: Normal range of motion.      Cervical back: Normal range of motion and neck supple.   Lymphadenopathy:      Cervical: No cervical adenopathy.   Skin:     General: Skin is warm.      Capillary Refill: Capillary refill takes less than 2 seconds.  "     Coloration: Skin is not jaundiced.      Findings: No rash.   Neurological:      General: No focal deficit present.      Mental Status: He is alert and oriented to person, place, and time.      Cranial Nerves: No cranial nerve deficit.      Motor: No weakness.      Gait: Gait normal.   Psychiatric:         Mood and Affect: Mood normal.         Behavior: Behavior normal.         Thought Content: Thought content normal.         Judgment: Judgment normal.           Vitals:    01/16/24 0759   BP: 124/74   Pulse: 98   Resp: 14   SpO2: 98%   Weight: 82.1 kg (181 lb)   Height: 5' 9\" (1.753 m)       Here are the findings from today's exam:(also see cardiology and dermatology reports)        1. Well adult exam  Assessment & Plan:  Overall, I think you are in pretty good health today, but please refer to  Individually listed diagnoses, cardiology, and Dermatology reports for more detail).    Your last colonoscopy was in 2018.  You were advised to repeat this again in 10 years (2028).  I reviewed your vaccination history.  I would consider getting this years COVID booster, especially if COVID incidence continues to increase this winter.  I am glad you received your flu shot this season.  You are uncertain to when your last tetanus booster was (this should be done every 10 years).  I would recommend seeing your PCP or going to your local pharmacy to have this updated.  We also discussed the shingles vaccine (Shingrix).  This is a 2 vaccine series given approximately 3 months apart to help prevent shingles.  It is covered by most insurances.  If you are interested in this, you can go to your PCP or local pharmacy to have this administered.    Lastly, I would recommend continuing a healthy diet (including your OTC vitamin D supplement.  I would also recommend enhanced calcium ingestion in your diet to help prevent bone loss).  I would also recommend continuing your regular exercise regimen (at least 150 minutes of aerobic " exercise weekly).          2. Essential hypertension  Assessment & Plan:  Your blood pressure today was 124/74.  This remains well-controlled on lisinopril 20 mg daily.      3. Mixed hyperlipidemia  Assessment & Plan:  Your cholesterol today was very good at 153.  Your bad cholesterol/LDL was also good at 85.  I would recommend continuing atorvastatin 10 mg daily.  I would also recommend following a heart healthy/low-fat diet and regular exercise program.  I would recommend repeating your labs again in 1 year.      4. Vitamin D deficiency  Assessment & Plan:  Your vitamin D level today was a little low at 27.8 (normal is greater than 30).  I would recommend increasing your OTC vitamin D supplement dosage to 3000 international units daily.      5. Gilbert's syndrome  Assessment & Plan:  Your bilirubin level today is still mildly elevated, but stable (1.12).  Fortunately, your previous imaging studies of your liver have been normal.  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  I would recommend continued yearly labs to monitor this.      6. BPH without obstruction/lower urinary tract symptoms  Assessment & Plan:  On examination today, you have evidence of mild enlargement of your prostate.  Also, your PSA blood test was normal.  Fortunately, you are not exhibiting symptoms of BPH.  I would recommend continuing with yearly prostate cancer screenings.      7. Erectile dysfunction, unspecified erectile dysfunction type  Assessment & Plan:  It appears that you are doing well with occasional use of sildenafil (Viagra).  I think it is reasonable to continue this medication.          Dilan,    Thank you for choosing [facility].  It was a pleasure seeing you again today.  If you have any questions regarding today's exam, feel free to contact me. We hope to see you again in the future.    Carltios Avelar ([facility] Lead Physician)  [phone]  [phone] (cell)  [email or web address]   Assessment/Plan:

## 2024-01-16 NOTE — ASSESSMENT & PLAN NOTE
On examination today, you have evidence of mild enlargement of your prostate.  Also, your PSA blood test was normal.  Fortunately, you are not exhibiting symptoms of BPH.  I would recommend continuing with yearly prostate cancer screenings.

## 2024-01-16 NOTE — PROGRESS NOTES
Heart and Vascular Summary:    Baseline ECG:  Normal sinus rhythm, normal ECG.             Echocardiogram: Normal right and left ventricular size and function.  Normal valvular structure and function.  This is a normal echocardiogram.              Stress Echocardiogram: Normal exercise capacity (9 mins), achieving 10.1 METS, and 92% of maximal predicted heart rate.  You had some changes on the ECG portion to suggest ischemia or blockage.  Blood pressure was normal at the start of the test, with a normal response to exercise (peak blood pressure of 182 systolic).  You had a good heart rate recovery after exercise.  Normal baseline left and right ventricular wall motion and function on echocardiogram with no wall motion abnormalities seen at peak stress to suggest any significant blockages in your coronary arteries over 50% that may require revascularization.   This is a difference in your stress test from last time.  The ECG portion did not reveal any changes in the last study.  The echocardiogram pictures have a high sensitivity for picking up ischemic disease, so if the images are normal (as yours were), there is less concern for an active blockage.  That being said, if you develop any chest pain, this should be taken seriously and you should be evaluated further.          Lipid Profile: this revealed total cholesterol of 153, LDL of 85, HDL of 48, and a Triglyceride level of 99.  The total cholesterol is a sum of its individual parts.  The HDL is the good cholesterol, which is protective to your heart.  Your level of 48 is over the goal of over 40, which is great.  The Triglycerides are a marker of your diet and genetics.  Less than 150 is what we aim for, and your level is well controlled. The LDL is the bad cholesterol, the cholesterol that builds atherosclerotic plaque in your arteries.  The level of 85 is well controlled with your goal of less than 100.  Your statin medication is working well for you.

## 2024-01-16 NOTE — ASSESSMENT & PLAN NOTE
Overall, I think you are in pretty good health today, but please refer to  Individually listed diagnoses, cardiology, and Dermatology reports for more detail).    Your last colonoscopy was in 2018.  You were advised to repeat this again in 10 years (2028).  I reviewed your vaccination history.  I would consider getting this years COVID booster, especially if COVID incidence continues to increase this winter.  I am glad you received your flu shot this season.  You are uncertain to when your last tetanus booster was (this should be done every 10 years).  I would recommend seeing your PCP or going to your local pharmacy to have this updated.  We also discussed the shingles vaccine (Shingrix).  This is a 2 vaccine series given approximately 3 months apart to help prevent shingles.  It is covered by most insurances.  If you are interested in this, you can go to your PCP or local pharmacy to have this administered.    Lastly, I would recommend continuing a healthy diet (including your OTC vitamin D supplement.  I would also recommend enhanced calcium ingestion in your diet to help prevent bone loss).  I would also recommend continuing your regular exercise regimen (at least 150 minutes of aerobic exercise weekly).

## 2024-01-16 NOTE — ASSESSMENT & PLAN NOTE
Your cholesterol today was very good at 153.  Your bad cholesterol/LDL was also good at 85.  I would recommend continuing atorvastatin 10 mg daily.  I would also recommend following a heart healthy/low-fat diet and regular exercise program.  I would recommend repeating your labs again in 1 year.

## 2024-01-16 NOTE — ASSESSMENT & PLAN NOTE
Your vitamin D level today was a little low at 27.8 (normal is greater than 30).  I would recommend increasing your OTC vitamin D supplement dosage to 3000 international units daily.

## 2024-01-16 NOTE — PROGRESS NOTES
"  Your [facility] Board-Certified Dermatologist's Name: Tristin Jacobson MD    Skin Screening Exam:    A chaperone was present throughout the entire encounter.      SKIN:  FULL ORGAN SYSTEM EXAM   Hair, Scalp, Ears, Face Normal except as noted below in Assessment   Neck, Cervical Chain Nodes Normal except as noted below in Assessment   Right Arm/Hand/Fingers Normal except as noted below in Assessment   Left Arm/Hand/Fingers Normal except as noted below in Assessment   Chest/Breasts/Axillae   Examined areas normal except as noted below in Assessment   Abdomen, Umbilicus Normal except as noted below in Assessment   Back/Spine Normal except as noted below in Assessment   Groin/Genitalia/Buttocks Did the patient specifically refuse to have the areas \"under-the-underwear\" examined by the Dermatologist? YES  Examined areas normal except as noted below in Assessment   Right Leg, Foot, Toes Normal except as noted below in Assessment   Left Leg, Foot, Toes Normal except as noted below in Assessment        Assessment and Plan by Diagnosis:    Use a moisturizer + sunscreen \"combo\" product such as Neutrogena Daily Defense SPF 50+ or CeraVe AM at least three times a day.  Follow-up with your private dermatologist or one of our board-certified [facility] Dermatologists as discussed.  [facility] Dermatology's own Dr. Roya Mishra is available for consultation for skin enhancement and revitalization services; please mention \"[facility]\" for expedited scheduling    MELANOCYTIC NEVI (\"Moles\")    Physical Exam:  Anatomic Location Affected:   whole body  Morphological Description:  Scattered, 1-4mm round to ovoid, symmetrical-appearing, even bordered, skin colored to dark brown macules/papules, mostly in sun-exposed areas  Pertinent Positives:  Pertinent Negatives:    Additional History of Present Condition:  NA    Assessment and Plan:  Based on a thorough discussion of this condition and the management approach to it (including a " "comprehensive discussion of the known risks, side effects and potential benefits of treatment), the patient (family) agrees to implement the following specific plan:  Continue to monitor     Melanocytic Nevi  Melanocytic nevi (\"moles\") are tan or brown, raised or flat areas of the skin which have an increased number of melanocytes. Melanocytes are the cells in our body which make pigment and account for skin color.    Some moles are present at birth (I.e., \"congenital nevi\"), while others come up later in life (i.e., \"acquired nevi\").  The sun can stimulate the body to make more moles.  Sunburns are not the only thing that triggers more moles.  Chronic sun exposure can do it too.     Clinically distinguishing a healthy mole from melanoma may be difficult, even for experienced dermatologists. The \"ABCDE's\" of moles have been suggested as a means of helping to alert a person to a suspicious mole and the possible increased risk of melanoma.  The suggestions for raising alert are as follows:    Asymmetry: Healthy moles tend to be symmetric, while melanomas are often asymmetric.  Asymmetry means if you draw a line through the mole, the two halves do not match in color, size, shape, or surface texture. Asymmetry can be a result of rapid enlargement of a mole, the development of a raised area on a previously flat lesion, scaling, ulceration, bleeding or scabbing within the mole.  Any mole that starts to demonstrate \"asymmetry\" should be examined promptly by a board certified dermatologist.     Border: Healthy moles tend to have discrete, even borders.  The border of a melanoma often blends into the normal skin and does not sharply delineate the mole from normal skin.  Any mole that starts to demonstrate \"uneven borders\" should be examined promptly by a board certified dermatologist.     Color: Healthy moles tend to be one color throughout.  Melanomas tend to be made up of different colors ranging from dark black, blue, " "white, or red.  Any mole that demonstrates a color change should be examined promptly by a board certified dermatologist.     Diameter: Healthy moles tend to be smaller than 0.6 cm in size; an exception are \"congenital nevi\" that can be larger.  Melanomas tend to grow and can often be greater than 0.6 cm (1/4 of an inch, or the size of a pencil eraser). This is only a guideline, and many normal moles may be larger than 0.6 cm without being unhealthy.  Any mole that starts to change in size (small to bigger or bigger to smaller) should be examined promptly by a board certified dermatologist.     Evolving: Healthy moles tend to \"stay the same.\"  Melanomas may often show signs of change or evolution such as a change in size, shape, color, or elevation.  Any mole that starts to itch, bleed, crust, burn, hurt, or ulcerate or demonstrate a change or evolution should be examined promptly by a board certified dermatologist.      Dysplastic Nevi  Dysplastic moles are moles that fit the ABCDE rules of melanoma but are not identified as melanomas when examined under the microscope.  They may indicate an increased risk of melanoma in that person. If there is a family history of melanoma, most experts agree that the person may be at an increased risk for developing a melanoma.  Experts still do not agree on what dysplastic moles mean in patients without a personal or family history of melanoma.  Dysplastic moles are usually larger than common moles and have different colors within it with irregular borders. The appearance can be very similar to a melanoma. Biopsies of dysplastic moles may show abnormalities which are different from a regular mole.      Melanoma  Malignant melanoma is a type of skin cancer that can be deadly if it spreads throughout the body. The incidence of melanoma in the [place] is growing faster than any other cancer. Melanoma usually grows near the surface of the skin for a period of time, and then " "begins to grow deeper into the skin. Once it grows deeper into the skin, the risk of spread to other organs greatly increases. Therefore, early detection and removal of a malignant melanoma may result in a better chance at a complete cure; removal after the tumor has spread may not be as effective, leading to worse clinical outcomes such as death.    The true rate of nevus transformation into a melanoma is unknown. It has been estimated that the lifetime risk for any acquired melanocytic nevus on any 20-year-old individual transforming into melanoma by age 80 is 0.03% (1 in 3,164) for men and 0.009% (1 in 10,800) for women.     The appearance of a \"new mole\" remains one of the most reliable methods for identifying a malignant melanoma.  Occasionally, melanomas appear as rapidly growing, blue-black, dome-shaped bumps within a previous mole or previous area of normal skin.  Other times, melanomas are suspected when a mole suddenly appears or changes. Itching, burning, or pain in a pigmented lesion should increase suspicion, but most patients with early melanoma have no skin discomfort whatsoever.  Melanoma can occur anywhere on the skin, including areas that are difficult for self-examination. Many melanomas are first noticed by other family members.  Suspicious-looking moles may be removed for microscopic examination.       You may be able to prevent death from melanoma by doing two simple things:    Try to avoid unnecessary sun exposure and protect your skin when it is exposed to the sun.  People who live near the equator, people who have intermittent exposures to large amounts of sun, and people who have had sunburns in childhood or adolescence have an increased risk for melanoma. Sun sense and vigilant sun protection may be keys to helping to prevent melanoma.  We recommend wearing UPF-rated sun protective clothing and sunglasses whenever possible and applying a moisturizer-sunscreen combination product (SPF 50+) " "such as Neutrogena Daily Defense to sun exposed areas of skin at least three times a day.    Have your moles regularly examined by a board certified dermatologist AND by yourself or a family member/friend at home.  We recommend that you have your moles examined at least once a year by a board certified dermatologist.  Use your birthday as an annual reminder to have your \"Birthday Suit\" (I.e., your skin) examined; it is a nice birthday gift to yourself to know that your skin is healthy appearing!  Additionally, at-home self examinations may be helpful for detecting a possible melanoma.  Use the ABCDEs we discussed and check your moles once a month at home.        "

## 2024-01-16 NOTE — PROGRESS NOTES
Nutritional Summary and Recommendations:    Patient Nutrition-Oriented Medical/Diet Hx  Dilan presents today to [facility] for nutrition re-assessment and counseling. Since last visit, Dilan has maintained weight and reports no significant changes to nutrition. Dilan continues to focus on incorporating breakfast and healthy snacking into his daily routine. Goals reviewed from last year. Discussion focused on healthy snacking, heart healthy diet, and reading food labels for appropriate portion sizes. Labs reviewed.      Nutrition Related Medications/Supplements:  Vitamin D3- 2000 IU daily     Labs:  A1C 5.4  Total Cholesterol 153  Triglycerides 99  HDL 48  LDL 85  Vitamin D 27.8    Anthropometrics:     Ht: 5 ft 9 in Wt: 180.6 lb   BMI: 26.7     BMR: 1654 kcals  Fat%: 25.8%  Acceptable Range: 13-25%     Fat Mass: 46.6 lb FFM: 134 lb  TBW: 98 lb      Nutrition Diagnosis:  Altered nutrition related laboratory values  r/t physiological events as evidenced by Vitamin D 27.8     Nutrition Recommendations:    Maintain body composition by next visit.  Continue to supplement with Vitamin D3, take supplement with food for better absorption.  Read food labels for appropriate portion sizes, measure out portion sizes for snack foods.  Pair a protein food with a complex carbohydrate (>/= 3 grams of fiber/serving) for a nutritious snack.  Contact RD with any questions or concerns.     Nutrition Education     Importance of Vitamin D, Cardiac Heart Healthy Diet, and Healthy Snacking       Perceived Comprehension:  good     Expected Compliance:  good

## 2024-01-17 RX ORDER — SILDENAFIL 50 MG/1
50 TABLET, FILM COATED ORAL AS NEEDED
Qty: 6 TABLET | Refills: 1 | Status: SHIPPED | OUTPATIENT
Start: 2024-01-17

## 2024-02-21 PROBLEM — Z00.00 WELL ADULT EXAM: Status: RESOLVED | Noted: 2020-11-30 | Resolved: 2024-02-21

## 2024-05-22 DIAGNOSIS — I10 ESSENTIAL HYPERTENSION: ICD-10-CM

## 2024-05-22 RX ORDER — LISINOPRIL 20 MG/1
20 TABLET ORAL DAILY
Qty: 90 TABLET | Refills: 3 | Status: SHIPPED | OUTPATIENT
Start: 2024-05-22

## 2024-12-07 DIAGNOSIS — E78.49 OTHER HYPERLIPIDEMIA: ICD-10-CM

## 2024-12-09 RX ORDER — ATORVASTATIN CALCIUM 10 MG/1
10 TABLET, FILM COATED ORAL DAILY
Qty: 90 TABLET | Refills: 3 | Status: SHIPPED | OUTPATIENT
Start: 2024-12-09

## 2025-01-28 DIAGNOSIS — Z00.00 ENCOUNTER FOR PREVENTIVE HEALTH EXAMINATION: Primary | ICD-10-CM

## 2025-02-13 ENCOUNTER — OFFICE VISIT (OUTPATIENT)
Dept: FAMILY MEDICINE CLINIC | Facility: CLINIC | Age: 65
End: 2025-02-13

## 2025-02-13 ENCOUNTER — HOSPITAL ENCOUNTER (OUTPATIENT)
Dept: NON INVASIVE DIAGNOSTICS | Facility: HOSPITAL | Age: 65
Discharge: HOME | End: 2025-02-13

## 2025-02-13 ENCOUNTER — HOSPITAL ENCOUNTER (OUTPATIENT)
Dept: VASCULAR ULTRASOUND | Facility: HOSPITAL | Age: 65
Discharge: HOME | End: 2025-02-13
Attending: FAMILY MEDICINE

## 2025-02-13 ENCOUNTER — HOSPITAL ENCOUNTER (OUTPATIENT)
Dept: CT IMAGING | Facility: HOSPITAL | Age: 65
Discharge: HOME | End: 2025-02-13
Attending: FAMILY MEDICINE

## 2025-02-13 ENCOUNTER — HOSPITAL ENCOUNTER (OUTPATIENT)
Dept: ULTRASOUND IMAGING | Facility: HOSPITAL | Age: 65
Discharge: HOME | End: 2025-02-13
Attending: FAMILY MEDICINE

## 2025-02-13 ENCOUNTER — LAB (OUTPATIENT)
Dept: LAB | Facility: CLINIC | Age: 65
End: 2025-02-13

## 2025-02-13 VITALS
RESPIRATION RATE: 12 BRPM | BODY MASS INDEX: 27.89 KG/M2 | WEIGHT: 184 LBS | DIASTOLIC BLOOD PRESSURE: 82 MMHG | HEIGHT: 68 IN | TEMPERATURE: 97 F | HEART RATE: 82 BPM | SYSTOLIC BLOOD PRESSURE: 140 MMHG | OXYGEN SATURATION: 99 %

## 2025-02-13 VITALS
WEIGHT: 184.08 LBS | BODY MASS INDEX: 27.9 KG/M2 | HEART RATE: 70 BPM | DIASTOLIC BLOOD PRESSURE: 82 MMHG | SYSTOLIC BLOOD PRESSURE: 140 MMHG | HEIGHT: 68 IN

## 2025-02-13 DIAGNOSIS — E78.2 MIXED HYPERLIPIDEMIA: ICD-10-CM

## 2025-02-13 DIAGNOSIS — Z00.00 ENCOUNTER FOR PREVENTIVE HEALTH EXAMINATION: ICD-10-CM

## 2025-02-13 DIAGNOSIS — I10 ESSENTIAL HYPERTENSION: ICD-10-CM

## 2025-02-13 DIAGNOSIS — K21.9 GASTROESOPHAGEAL REFLUX DISEASE WITHOUT ESOPHAGITIS: ICD-10-CM

## 2025-02-13 DIAGNOSIS — Z00.00 HEALTH CARE MAINTENANCE: Primary | ICD-10-CM

## 2025-02-13 LAB
25(OH)D3 SERPL-MCNC: 34 NG/ML (ref 30–100)
ALBUMIN SERPL BCG-MCNC: 4.8 G/DL (ref 3.5–5)
ALP SERPL-CCNC: 55 U/L (ref 34–104)
ALT SERPL W P-5'-P-CCNC: 31 U/L (ref 7–52)
ANION GAP SERPL CALCULATED.3IONS-SCNC: 6 MMOL/L (ref 4–13)
AORTIC ROOT: 3.7 CM
ASCENDING AORTA: 3.5 CM
AST SERPL W P-5'-P-CCNC: 21 U/L (ref 13–39)
ATRIAL RATE: 66 BPM
BACTERIA UR QL AUTO: NORMAL /HPF
BASOPHILS # BLD AUTO: 0.06 THOUSANDS/ΜL (ref 0–0.1)
BASOPHILS NFR BLD AUTO: 1 % (ref 0–1)
BILIRUB SERPL-MCNC: 1.5 MG/DL (ref 0.2–1)
BILIRUB UR QL STRIP: NEGATIVE
BSA FOR ECHO PROCEDURE: 1.97 M2
BUN SERPL-MCNC: 16 MG/DL (ref 5–25)
CALCIUM SERPL-MCNC: 9.8 MG/DL (ref 8.4–10.2)
CHEST PAIN STATEMENT: NORMAL
CHLORIDE SERPL-SCNC: 101 MMOL/L (ref 96–108)
CHOLEST SERPL-MCNC: 181 MG/DL (ref ?–200)
CLARITY UR: CLEAR
CO2 SERPL-SCNC: 27 MMOL/L (ref 21–32)
COLOR UR: COLORLESS
CREAT SERPL-MCNC: 0.89 MG/DL (ref 0.6–1.3)
CRP SERPL HS-MCNC: 0.87 MG/L
E WAVE DECELERATION TIME: 162 MS
E/A RATIO: 1.11
EOSINOPHIL # BLD AUTO: 0.08 THOUSAND/ΜL (ref 0–0.61)
EOSINOPHIL NFR BLD AUTO: 1 % (ref 0–6)
ERYTHROCYTE [DISTWIDTH] IN BLOOD BY AUTOMATED COUNT: 12.3 % (ref 11.6–15.1)
EST. AVERAGE GLUCOSE BLD GHB EST-MCNC: 105 MG/DL
FRACTIONAL SHORTENING: 33 (ref 28–44)
GFR SERPL CREATININE-BSD FRML MDRD: 90 ML/MIN/1.73SQ M
GLUCOSE P FAST SERPL-MCNC: 101 MG/DL (ref 65–99)
GLUCOSE UR STRIP-MCNC: NEGATIVE MG/DL
HBA1C MFR BLD: 5.3 %
HCT VFR BLD AUTO: 47.6 % (ref 36.5–49.3)
HCV AB SER QL: NORMAL
HDLC SERPL-MCNC: 54 MG/DL
HGB BLD-MCNC: 16.8 G/DL (ref 12–17)
HGB UR QL STRIP.AUTO: NEGATIVE
IMM GRANULOCYTES # BLD AUTO: 0.02 THOUSAND/UL (ref 0–0.2)
IMM GRANULOCYTES NFR BLD AUTO: 0 % (ref 0–2)
INTERVENTRICULAR SEPTUM IN DIASTOLE (PARASTERNAL SHORT AXIS VIEW): 0.9 CM
INTERVENTRICULAR SEPTUM: 0.9 CM (ref 0.6–1.1)
KETONES UR STRIP-MCNC: NEGATIVE MG/DL
LAAS-AP2: 18.7 CM2
LAAS-AP4: 20 CM2
LDLC SERPL CALC-MCNC: 97 MG/DL (ref 0–100)
LEFT ATRIUM SIZE: 3.4 CM
LEFT ATRIUM VOLUME (MOD BIPLANE): 54 ML
LEFT ATRIUM VOLUME INDEX (MOD BIPLANE): 27.3 ML/M2
LEFT INTERNAL DIMENSION IN SYSTOLE: 2.9 CM (ref 2.1–4)
LEFT VENTRICULAR INTERNAL DIMENSION IN DIASTOLE: 4.3 CM (ref 3.5–6)
LEFT VENTRICULAR POSTERIOR WALL IN END DIASTOLE: 0.9 CM
LEFT VENTRICULAR STROKE VOLUME: 52 ML
LEUKOCYTE ESTERASE UR QL STRIP: NEGATIVE
LV EF US.2D.A4C+ESTIMATED: 54 %
LVSV (TEICH): 52 ML
LYMPHOCYTES # BLD AUTO: 2.35 THOUSANDS/ΜL (ref 0.6–4.47)
LYMPHOCYTES NFR BLD AUTO: 36 % (ref 14–44)
MAX DIASTOLIC BP: 62 MMHG
MAX HR PERCENT: 100 %
MAX HR: 157 BPM
MAX PREDICTED HEART RATE: 157 BPM
MCH RBC QN AUTO: 31.3 PG (ref 26.8–34.3)
MCHC RBC AUTO-ENTMCNC: 35.3 G/DL (ref 31.4–37.4)
MCV RBC AUTO: 89 FL (ref 82–98)
MONOCYTES # BLD AUTO: 0.63 THOUSAND/ΜL (ref 0.17–1.22)
MONOCYTES NFR BLD AUTO: 10 % (ref 4–12)
MV E'TISSUE VEL-LAT: 11 CM/S
MV E'TISSUE VEL-SEP: 9 CM/S
MV PEAK A VEL: 0.65 M/S
MV PEAK E VEL: 72 CM/S
MV STENOSIS PRESSURE HALF TIME: 47 MS
MV VALVE AREA P 1/2 METHOD: 4.68
NEUTROPHILS # BLD AUTO: 3.43 THOUSANDS/ΜL (ref 1.85–7.62)
NEUTS SEG NFR BLD AUTO: 52 % (ref 43–75)
NITRITE UR QL STRIP: NEGATIVE
NON-SQ EPI CELLS URNS QL MICRO: NORMAL /HPF
NRBC BLD AUTO-RTO: 0 /100 WBCS
P AXIS: 61 DEGREES
PH UR STRIP.AUTO: 5 [PH]
PLATELET # BLD AUTO: 215 THOUSANDS/UL (ref 149–390)
PMV BLD AUTO: 10.7 FL (ref 8.9–12.7)
POTASSIUM SERPL-SCNC: 4.3 MMOL/L (ref 3.5–5.3)
PR INTERVAL: 158 MS
PROT SERPL-MCNC: 7.6 G/DL (ref 6.4–8.4)
PROT UR STRIP-MCNC: NEGATIVE MG/DL
PROTOCOL NAME: NORMAL
PSA SERPL-MCNC: 1.5 NG/ML (ref 0–4)
QRS AXIS: 60 DEGREES
QRSD INTERVAL: 86 MS
QT INTERVAL: 400 MS
QTC INTERVAL: 420 MS
RATE PRESSURE PRODUCT: NORMAL
RBC # BLD AUTO: 5.37 MILLION/UL (ref 3.88–5.62)
RBC #/AREA URNS AUTO: NORMAL /HPF
RIGHT ATRIAL 2D VOLUME: 42 ML
RIGHT ATRIUM AREA SYSTOLE A4C: 16.6 CM2
RIGHT VENTRICLE ID DIMENSION: 4.1 CM
SL CV LEFT ATRIUM LENGTH A2C: 5.7 CM
SL CV LV EF: 55
SL CV LV EF: 55
SL CV PED ECHO LEFT VENTRICLE DIASTOLIC VOLUME (MOD BIPLANE) 2D: 84 ML
SL CV PED ECHO LEFT VENTRICLE SYSTOLIC VOLUME (MOD BIPLANE) 2D: 32 ML
SL CV STRESS RECOVERY BP: NORMAL MMHG
SL CV STRESS RECOVERY HR: 104 BPM
SL CV STRESS RECOVERY O2 SAT: 98 %
SL CV STRESS STAGE REACHED: 5
SODIUM SERPL-SCNC: 134 MMOL/L (ref 135–147)
SP GR UR STRIP.AUTO: 1 (ref 1–1.03)
STRESS ANGINA INDEX: 0
STRESS BASELINE BP: NORMAL MMHG
STRESS BASELINE HR: 73 BPM
STRESS O2 SAT REST: 98 %
STRESS PEAK HR: 157 BPM
STRESS POST ESTIMATED WORKLOAD: 14.3 METS
STRESS POST EXERCISE DUR MIN: 12 MIN
STRESS POST EXERCISE DUR MIN: 12 MIN
STRESS POST EXERCISE DUR SEC: 30 SEC
STRESS POST EXERCISE DUR SEC: 30 SEC
STRESS POST O2 SAT PEAK: 93 %
STRESS POST PEAK BP: 200 MMHG
STRESS POST PEAK HR: 157 BPM
STRESS POST PEAK SYSTOLIC BP: 200 MMHG
T WAVE AXIS: 52 DEGREES
TARGET HR FORMULA: NORMAL
TEST INDICATION: NORMAL
TRICUSPID ANNULAR PLANE SYSTOLIC EXCURSION: 2.4 CM
TRIGL SERPL-MCNC: 151 MG/DL (ref ?–150)
TSH SERPL DL<=0.05 MIU/L-ACNC: 1.48 UIU/ML (ref 0.45–4.5)
UROBILINOGEN UR STRIP-ACNC: <2 MG/DL
VENTRICULAR RATE: 66 BPM
WBC # BLD AUTO: 6.57 THOUSAND/UL (ref 4.31–10.16)
WBC #/AREA URNS AUTO: NORMAL /HPF

## 2025-02-13 PROCEDURE — 93922 UPR/L XTREMITY ART 2 LEVELS: CPT

## 2025-02-13 PROCEDURE — 80061 LIPID PANEL: CPT

## 2025-02-13 PROCEDURE — 93005 ELECTROCARDIOGRAM TRACING: CPT

## 2025-02-13 PROCEDURE — 85025 COMPLETE CBC W/AUTO DIFF WBC: CPT

## 2025-02-13 PROCEDURE — 80053 COMPREHEN METABOLIC PANEL: CPT

## 2025-02-13 PROCEDURE — 81001 URINALYSIS AUTO W/SCOPE: CPT

## 2025-02-13 PROCEDURE — 36415 COLL VENOUS BLD VENIPUNCTURE: CPT

## 2025-02-13 PROCEDURE — 99499EX: Performed by: FAMILY MEDICINE

## 2025-02-13 PROCEDURE — 93306 TTE W/DOPPLER COMPLETE: CPT | Performed by: INTERNAL MEDICINE

## 2025-02-13 PROCEDURE — 84443 ASSAY THYROID STIM HORMONE: CPT

## 2025-02-13 PROCEDURE — 83695 ASSAY OF LIPOPROTEIN(A): CPT

## 2025-02-13 PROCEDURE — 82306 VITAMIN D 25 HYDROXY: CPT

## 2025-02-13 PROCEDURE — 86803 HEPATITIS C AB TEST: CPT

## 2025-02-13 PROCEDURE — 93350 STRESS TTE ONLY: CPT | Performed by: INTERNAL MEDICINE

## 2025-02-13 PROCEDURE — 93010 ELECTROCARDIOGRAM REPORT: CPT | Performed by: INTERNAL MEDICINE

## 2025-02-13 PROCEDURE — 93306 TTE W/DOPPLER COMPLETE: CPT

## 2025-02-13 PROCEDURE — 76700 US EXAM ABDOM COMPLETE: CPT

## 2025-02-13 PROCEDURE — 75571 CT HRT W/O DYE W/CA TEST: CPT

## 2025-02-13 PROCEDURE — 84153 ASSAY OF PSA TOTAL: CPT

## 2025-02-13 PROCEDURE — 86141 C-REACTIVE PROTEIN HS: CPT

## 2025-02-13 PROCEDURE — 83036 HEMOGLOBIN GLYCOSYLATED A1C: CPT

## 2025-02-13 PROCEDURE — VASC: Performed by: STUDENT IN AN ORGANIZED HEALTH CARE EDUCATION/TRAINING PROGRAM

## 2025-02-13 PROCEDURE — 93350 STRESS TTE ONLY: CPT

## 2025-02-13 RX ORDER — FAMOTIDINE 40 MG/1
40 TABLET, FILM COATED ORAL DAILY
Qty: 30 TABLET | Refills: 1 | Status: SHIPPED | OUTPATIENT
Start: 2025-02-13

## 2025-02-13 NOTE — PROGRESS NOTES
[facility] Physical Exam     Cristian Hinds is a 64 y.o. male who is presenting for an [facility] Physical Exam at [facility]. Dilan is doing well, he does complain of intermittent gastric reflux. He continues to take his atorvastatin and lisinopril without any problems.    Review of Systems   Constitutional:  Negative for chills, fatigue and fever.   HENT:  Negative for congestion, ear pain, hearing loss, postnasal drip, rhinorrhea and sore throat.    Eyes:  Negative for pain and visual disturbance.   Respiratory:  Negative for chest tightness, shortness of breath and wheezing.    Cardiovascular:  Negative for chest pain and leg swelling.   Gastrointestinal:  Negative for abdominal distention, abdominal pain, constipation, diarrhea and vomiting.   Endocrine: Negative for cold intolerance and heat intolerance.   Genitourinary:  Negative for difficulty urinating, frequency and urgency.   Musculoskeletal:  Negative for arthralgias and gait problem.   Skin:  Negative for color change.   Neurological:  Negative for dizziness, tremors, syncope, numbness and headaches.   Hematological:  Negative for adenopathy.   Psychiatric/Behavioral:  Negative for agitation, confusion and sleep disturbance. The patient is not nervous/anxious.          Active Ambulatory Problems     Diagnosis Date Noted   • Essential hypertension 02/06/2018   • BPH without obstruction/lower urinary tract symptoms 02/06/2018   • Gilbert's syndrome 09/17/2020   • Mixed hyperlipidemia 09/17/2020   • Vitamin D deficiency 11/23/2021   • Erectile dysfunction 11/23/2021     Resolved Ambulatory Problems     Diagnosis Date Noted   • Bilateral impacted cerumen 02/06/2018   • Elevated PSA measurement 04/16/2019   • Impaired fasting glucose 09/16/2020   • Dysplastic nevus, severe vs. early melanoma left thigh 07/2019   • Well adult exam 11/30/2020   • Thrombocytopenia (HCC) 11/23/2021     Past Medical History:  "  Diagnosis Date   • BPH (benign prostatic hyperplasia)    • ED (erectile dysfunction)    • Gilbert syndrome    • Hyperlipidemia        Past Surgical History:   Procedure Laterality Date   • COLONOSCOPY  2001   • COLONOSCOPY  2010    Virtual colonoscopy - clear.   • KNEE ARTHROSCOPY Left 2006    Meniscus.   • KNEE ARTHROSCOPY Right 2016    Meniscus.   • TX COLONOSCOPY FLX DX W/COLLJ SPEC WHEN PFRMD N/A 3/27/2018    Procedure: COLONOSCOPY;  Surgeon: Jean Shaffer MD;  Location: [facility];  Service: Gastroenterology   • TONSILLECTOMY  1969       Family History   Problem Relation Age of Onset   • Heart disease Father    • Hyperlipidemia Father    • Hyperlipidemia Brother        Social History     Tobacco Use   Smoking Status Never   Smokeless Tobacco Never         Current Outpatient Medications:   •  famotidine (PEPCID) 40 MG tablet, Take 1 tablet (40 mg total) by mouth daily, Disp: 30 tablet, Rfl: 1  •  atorvastatin (LIPITOR) 10 mg tablet, TAKE 1 TABLET BY MOUTH EVERY DAY, Disp: 90 tablet, Rfl: 3  •  cholecalciferol (VITAMIN D3) 1,000 units tablet, Take 1 tablet (1,000 Units total) by mouth daily for 30 days (Patient taking differently: Take 2,000 Units by mouth daily), Disp: 30 tablet, Rfl: 11  •  lisinopril (ZESTRIL) 20 mg tablet, TAKE 1 TABLET BY MOUTH EVERY DAY, Disp: 90 tablet, Rfl: 3  •  Omega 3 1200 MG CAPS, Take 1 capsule (1,200 mg total) by mouth 2 (two) times a day for 30 days, Disp: 60 capsule, Rfl: 11  •  sildenafil (VIAGRA) 50 MG tablet, TAKE 1 TABLET (50 MG TOTAL) BY MOUTH AS NEEDED FOR ERECTILE DYSFUNCTION FOR UP TO 10 DOSES, Disp: 6 tablet, Rfl: 1    Allergies   Allergen Reactions   • Penicillins Rash         Objective:    Vitals:    02/13/25 1045   BP: 140/82   BP Location: Left arm   Patient Position: Sitting   Pulse: 82   Resp: 12   Temp: (!) 97 °F (36.1 °C)   SpO2: 99%   Weight: 83.5 kg (184 lb)   Height: 5' 8\" (1.727 m)        Physical Exam  Vitals and nursing note reviewed.   Constitutional: "       General: He is not in acute distress.     Appearance: Normal appearance. He is well-developed.   HENT:      Head: Normocephalic.      Right Ear: Tympanic membrane and ear canal normal.      Left Ear: Tympanic membrane and ear canal normal.      Nose: Nose normal.      Mouth/Throat:      Mouth: Mucous membranes are moist.   Eyes:      General: No scleral icterus.     Extraocular Movements: Extraocular movements intact.      Conjunctiva/sclera: Conjunctivae normal.      Pupils: Pupils are equal, round, and reactive to light.   Neck:      Thyroid: No thyromegaly.   Cardiovascular:      Rate and Rhythm: Normal rate and regular rhythm.      Heart sounds: Normal heart sounds. No murmur heard.  Pulmonary:      Effort: Pulmonary effort is normal. No respiratory distress.      Breath sounds: Normal breath sounds. No wheezing.   Abdominal:      General: Bowel sounds are normal. There is no distension.      Palpations: Abdomen is soft.      Tenderness: There is no abdominal tenderness.   Genitourinary:     Prostate: Enlarged. No nodules present.      Rectum: External hemorrhoid present.   Musculoskeletal:         General: No tenderness. Normal range of motion.      Cervical back: Normal range of motion.      Right lower leg: No edema.      Left lower leg: No edema.   Lymphadenopathy:      Cervical: No cervical adenopathy.   Skin:     General: Skin is warm and dry.      Coloration: Skin is not pale.      Findings: No rash.   Neurological:      Mental Status: He is alert and oriented to person, place, and time.      Cranial Nerves: No cranial nerve deficit.   Psychiatric:         Behavior: Behavior normal.             Assessment/Plan:     Assessment & Plan  Health care maintenance         Essential hypertension  Controlled, continue the lisinopril.  Monitor blood pressure at home 1-2 times weekly.       Mixed hyperlipidemia  Well controlled, continue atorvastatin, check cholesterol yearly       Gastroesophageal reflux  disease without esophagitis  Start famotidine daily as needed.  If the symptoms worsen we will need to consider changing to daily.  Orders:  •  famotidine (PEPCID) 40 MG tablet; Take 1 tablet (40 mg total) by mouth daily         [facility] Summary:     Dilan, thank you for trusting us with your care.  We hope you enjoyed your experience today and gained insight about your health.  If you have any questions about today's findings please contact me at [email or web address].  You did great today and you are in very good health.  We hope that the information you received today will help you to make any necessary changes to improve your overall health.    There are many facets to staying healthy and I want to briefly review these.  Prevention of disease is essential.  Eating right, exercising and avoiding unhealthy habits are the hallmark for prevention.  You will see the recommendations from our nutritionist and exercise physiologist in this binder to help you make the right choices.  Vaccinations are an important part of prevention as well.  We recommend you receive the following vaccines: yearly influenza, shingles and tetanus vaccinations.  Unfortunately there are some conditions that cannot be prevented and it is imperative that we screen for these as appropriate.  We recommend you keep up with the following screenings: yearly PSA and colonoscopy as scheduled.

## 2025-02-13 NOTE — PROGRESS NOTES
Hearing Assessment Summary:   Hearing Screening    250Hz 500Hz 1000Hz 2000Hz 3000Hz 4000Hz 6000Hz 8000Hz   Right ear 25 15 20 35 20 25 30 30   Left ear 25 20 30 35 30 45 50 45   Comments: HEARING EVALUATION    Name:  Cristian Hinds  :  1960  Age:  64 y.o.   MRN:  [id number]  Date of Evaluation: 25     History: [facility] Exam  Reason for visit: Cristian Hinds is being seen today for an evaluation of hearing as part of an [facility] examination.  Patient reports no concern over change in hearing, and continues to deny tinnitus, dizziness and ear pain.       EVALUATION:    Otoscopic Evaluation:   Right Ear: Clear and healthy ear canal and tympanic membrane   Left Ear: Clear and healthy ear canal and tympanic membrane    Tympanometry:   Right: Type A - normal middle ear pressure and compliance   Left: Type A - normal middle ear pressure and compliance    Audiogram Results:  Right: Normal hearing sensitivity except for mild loss at 2k, 6k and 8k Hz.  Left: Nomal through 500 Hz sloping to mild/moderate sensorineural hearing loss    Stable from last evaluation 12/15/22.    *see attached audiogram      RECOMMENDATIONS:  Annual hearing eval, Return to [facility]. for F/U, Copy to Patient/Caregiver, and patient will consider hearing aid evaluation.    PATIENT EDUCATION:   Discussed results and recommendations with patient.  Questions were addressed and the patient was encouraged to contact our department should concerns arise.      Deepali Presley.  Clinical Audiologist'

## 2025-02-13 NOTE — PROGRESS NOTES
Fitness Summary and Recommendations: Cristian scored at the 80% on his body composition assessment with a bodyfat % of 17.6%. Cristian scored in the fair range for flexibility with a sit & reach score of 11 cm. His Muscle Strength/Endurance scores placed him at the 90% (lower body) and 95% (upper body) with a chair stand score of 30 and arm curl score of 47 respectively. Cristian Cardiovascular Score of 51.4 placed him at the 95%. Overall, Cristian would be considered to have an above average/excellent fitness level with an 76% score. His overall fitness score has increased by 0% from his previous test on 12/15/22. Continued emphasis on regular exercise and sound nutrition practices will enable Cristian to reach his optimal level of fitness.

## 2025-02-13 NOTE — PROGRESS NOTES
HEART AND VASCULAR SUMMARY    1. Vital Signs:  Blood Pressure  122/68mmHg, Pulse 73 bpm    2. Lipids: Total 181, , HDL 54, LDL 97, hs C-RP 0.87    3. ECG: Normal sinus rhythm    4. Echocardiogram: Normal cardiac function with no valvular abnormalities.     5. Stress ECHO: Excellent exercise capacity with no evidence of myocardial ischemia.    6. Vascular testing: Carotids - normal, Abdominal Aorta - normal    7. Coronary Calcium CT: 0 (26th percentile)    8. Cardiovascular Risk Score: 10.4% 10 year risk of heart disease or stroke.      Impression and Recommendations:    Mr. Hinds is a 64 year old man with a history of hypertension and dyslipidemia on medication who returns for an [facility] physical.  He is asymptomatic from a cardiac standpoint.  No chest pain, shortness of breath, or palpitations.      Excellent cardiovascular fitness.  Hypertension - controlled.  Dyslipidemia - on statin.  Continue healthy diet and exercise regimen.

## 2025-02-13 NOTE — PROGRESS NOTES
"Nutritional Summary and Recommendations:    Patient Nutrition-Oriented Medical/Diet Hx  Dilan presents today to [facility] for nutrition assessment and education.  From his last nutrition assessment he notes trying to adopt a healthier lifestyle. He is eating breakfast most every day: oatmeal (plain or flavored) made with water or an egg sandwich or yogurt with peanut butter and crackers. Lunch is a salad or turkey or tuna fish sandwich. Dinner will be a protein, grain/starch/vegetable.    Dilan notes he often gives in to grabbing not so healthy snack between breakfast and lunch at work-sometimes hungry but sometimes because it looks good, also will on occasion between lunch and the end of his work day.  It is observed tat Dilan may not be meeting estimated fluid needs. Reviewed the benefits of adequate fluid for overall health but also for appetite, discussed tips for increasing intake. Reviewed tips for heathy snacks. Suggested setting parameters on snacks eaten at work-for example only having them on even days and on odd days he packs a healthy snack. For bars suggested Rx bars, fiber one bars, kind bars.  Dilan notes he is a fast eater and often a bit after the meal he eels overfull. Reviewed tips for appropriate portions, suggested putting ~ 1.2 as much as he normally does on his plate so then he can make the decision if he would like to eat more or not, maybe to drink a glass of water after and then decide. It was a pleasure meeting with and speaking with Dilan today.      Nutrition Related Medications/Supplements:  N/A     Labs:  A1C:  Total Cholesterol: 181  Triglycerides: 151  HDL: 54  LDL: 97  Vitamin D: 34.0      Anthropometrics:     Ht: 5'9\"   Wt: 185 lb    BMI: 27.3     BMR: 1675 kcal  Fat%: 17.6%       Fat Mass: 32.6 lb FFM: 152.4 lb  TBW: 111.6 lb     Estimated Energy Requirements:    Actual wt  2178 kcal (BMR x AF 1.3)  84 g/k protein (1 g/kg)  0584-0944 mL/71-85 g fluid (30 mL/kg       Nutrition " Recommendations:    Aim for 71-85 oz of fluid per day  Aim for at least 1 serving each of fruits and vegetables per day  Reach out to dietitian with further questions or concerns    Nutrition Education  Adequate hydration, healthy snacks, increasing fruits and vegetables     Perceived Comprehension: Good      Expected Compliance: Good

## 2025-02-14 LAB — LPA SERPL-SCNC: 11.7 NMOL/L

## 2025-02-14 NOTE — PROGRESS NOTES
"  Your [facility] Board-Certified Dermatologist's Name: Jean Robertson MD    Skin Screening Exam:    A chaperone was present throughout the entire encounter.      SKIN:  FULL ORGAN SYSTEM EXAM   Hair, Scalp, Ears, Face Normal except as noted below in Assessment   Neck, Cervical Chain Nodes Normal except as noted below in Assessment   Right Arm/Hand/Fingers Normal except as noted below in Assessment   Left Arm/Hand/Fingers Normal except as noted below in Assessment   Chest/Breasts/Axillae Did the patient specifically refuse to have the areas \"under-the-bra\" examined by the Dermatologist? No  Examined areas normal except as noted below in Assessment   Abdomen, Umbilicus Normal except as noted below in Assessment   Back/Spine Normal except as noted below in Assessment   Groin/Genitalia/Buttocks Did the patient specifically refuse to have the areas \"under-the-underwear\" examined by the Dermatologist? No  Examined areas normal except as noted below in Assessment   Right Leg, Foot, Toes Normal except as noted below in Assessment   Left Leg, Foot, Toes Normal except as noted below in Assessment        Assessment and Plan by Diagnosis:    Use a moisturizer + sunscreen \"combo\" product such as Neutrogena Daily Defense SPF 50+ or CeraVe AM at least three times a day.  Follow-up with your private dermatologist or one of our board-certified [facility] Dermatologists as discussed.  [facility]'s own Dr. Roya Mishra is available for consultation for skin enhancement and revitalization services; please mention \"[facility]\" for expedited scheduling    "

## 2025-02-16 ENCOUNTER — RESULTS FOLLOW-UP (OUTPATIENT)
Dept: FAMILY MEDICINE CLINIC | Facility: CLINIC | Age: 65
End: 2025-02-16

## 2025-03-11 DIAGNOSIS — Z12.5 PROSTATE CANCER SCREENING: Primary | ICD-10-CM

## 2025-03-11 DIAGNOSIS — E78.2 MIXED HYPERLIPIDEMIA: ICD-10-CM

## 2025-03-12 DIAGNOSIS — K21.9 GASTROESOPHAGEAL REFLUX DISEASE WITHOUT ESOPHAGITIS: ICD-10-CM

## 2025-03-13 RX ORDER — FAMOTIDINE 40 MG/1
40 TABLET, FILM COATED ORAL DAILY
Qty: 30 TABLET | Refills: 1 | Status: SHIPPED | OUTPATIENT
Start: 2025-03-13

## 2025-05-03 DIAGNOSIS — K21.9 GASTROESOPHAGEAL REFLUX DISEASE WITHOUT ESOPHAGITIS: ICD-10-CM

## 2025-05-05 RX ORDER — FAMOTIDINE 40 MG/1
40 TABLET, FILM COATED ORAL DAILY
Qty: 90 TABLET | Refills: 1 | Status: SHIPPED | OUTPATIENT
Start: 2025-05-05

## 2025-06-03 DIAGNOSIS — I10 ESSENTIAL HYPERTENSION: ICD-10-CM

## 2025-06-04 RX ORDER — LISINOPRIL 20 MG/1
20 TABLET ORAL DAILY
Qty: 90 TABLET | Refills: 3 | Status: SHIPPED | OUTPATIENT
Start: 2025-06-04

## 2025-07-20 DIAGNOSIS — N52.9 ERECTILE DYSFUNCTION, UNSPECIFIED ERECTILE DYSFUNCTION TYPE: ICD-10-CM

## 2025-07-21 RX ORDER — SILDENAFIL 50 MG/1
50 TABLET, FILM COATED ORAL AS NEEDED
Qty: 6 TABLET | Refills: 1 | Status: SHIPPED | OUTPATIENT
Start: 2025-07-21